# Patient Record
Sex: FEMALE | Race: WHITE | NOT HISPANIC OR LATINO | Employment: FULL TIME | URBAN - METROPOLITAN AREA
[De-identification: names, ages, dates, MRNs, and addresses within clinical notes are randomized per-mention and may not be internally consistent; named-entity substitution may affect disease eponyms.]

---

## 2017-01-21 ENCOUNTER — ALLSCRIPTS OFFICE VISIT (OUTPATIENT)
Dept: OTHER | Facility: OTHER | Age: 32
End: 2017-01-21

## 2017-03-13 ENCOUNTER — GENERIC CONVERSION - ENCOUNTER (OUTPATIENT)
Dept: OTHER | Facility: OTHER | Age: 32
End: 2017-03-13

## 2017-05-09 ENCOUNTER — HOSPITAL ENCOUNTER (OUTPATIENT)
Dept: RADIOLOGY | Facility: CLINIC | Age: 32
Discharge: HOME | End: 2017-05-09
Payer: COMMERCIAL

## 2017-05-09 ENCOUNTER — ALLSCRIPTS OFFICE VISIT (OUTPATIENT)
Dept: OTHER | Facility: OTHER | Age: 32
End: 2017-05-09

## 2017-05-09 ENCOUNTER — TRANSCRIBE ORDERS (OUTPATIENT)
Dept: URGENT CARE | Facility: CLINIC | Age: 32
End: 2017-05-09

## 2017-05-09 DIAGNOSIS — S67.01XA: ICD-10-CM

## 2017-05-09 DIAGNOSIS — R63.5 ABNORMAL WEIGHT GAIN: ICD-10-CM

## 2017-05-09 PROCEDURE — 73140 X-RAY EXAM OF FINGER(S): CPT

## 2017-05-11 ENCOUNTER — ALLSCRIPTS OFFICE VISIT (OUTPATIENT)
Dept: OTHER | Facility: OTHER | Age: 32
End: 2017-05-11

## 2017-05-30 ENCOUNTER — ALLSCRIPTS OFFICE VISIT (OUTPATIENT)
Dept: OTHER | Facility: OTHER | Age: 32
End: 2017-05-30

## 2018-01-12 VITALS
HEART RATE: 74 BPM | RESPIRATION RATE: 16 BRPM | HEIGHT: 62 IN | WEIGHT: 165 LBS | OXYGEN SATURATION: 100 % | TEMPERATURE: 98.2 F | DIASTOLIC BLOOD PRESSURE: 64 MMHG | SYSTOLIC BLOOD PRESSURE: 104 MMHG | BODY MASS INDEX: 30.36 KG/M2

## 2018-01-14 VITALS
HEART RATE: 78 BPM | HEIGHT: 62 IN | DIASTOLIC BLOOD PRESSURE: 70 MMHG | TEMPERATURE: 97.3 F | BODY MASS INDEX: 30.91 KG/M2 | SYSTOLIC BLOOD PRESSURE: 120 MMHG | WEIGHT: 168 LBS | RESPIRATION RATE: 16 BRPM

## 2018-01-14 NOTE — CONSULTS
Chief Complaint  Chief Complaint Free Text Note Form: Patient is here for a MWM Consult  History of Present Illness  Free Text HPI: Obesity-  Severity: Mild  Onset: most of her life  Modifiers:worse w/ depot shot, had tried phentermine  Associated Symptoms:more winded, doesn't have energy  Past Medical History  Active Problems And Past Medical History Reviewed: The active problems and past medical history were reviewed and updated today  Assessment    1  Weight gain (783 1) (R63 5)   2  Anxiety (300 00) (F41 9)   3  Overweight (278 02) (E66 3)    Plan   1  (1) TSH WITH FT4 REFLEX; Status:Active; Requested for:30May2017;     Discussion/Summary  Discussion Summary:   82-year-old female w/ anxiety and excess weight here to pursue medical weight management to improve weight and health  Weight gain/overweight:  -discussed options of conservative vs ZAY program +/- meal replacement vs VLCD  -initial focus of 5-10% weight loss over 3-6 mos for improved health  -screening labs-check TSH, request labs from PCP    Anxiety: Stable  -Continue with Xanax as needed    Patient is interested in very low calorie diet and will meet with dietitian  She will then transition to new direction, 12 week follow up  Patient's Capacity to Self-Care: Patient is able to Self-Care  Understands and agrees with treatment plan: The treatment plan was reviewed with the patient/guardian  The patient/guardian understands and agrees with the treatment plan   Self Referrals:   Self Referrals: Yes Self-[employer] employee      Review of Systems  Focused-Female:   Constitutional: no fever  ENT: no sore throat  Cardiovascular: no chest pain and no palpitations  Respiratory: shortness of breath during exertion  Gastrointestinal: no abdominal pain  Genitourinary: no dysuria  Musculoskeletal: arthralgias  Integumentary: no rashes  Neurological: headache  Other Symptoms: Psych: +anxiety-stable  Active Problems    1   Acute upper respiratory infection (465 9) (J06 9)   2  Crushing injury of right thumb (927 3) (S67 01XA)    Surgical History  Surgical History Reviewed: The surgical history was reviewed and updated today  Family History  Family History Reviewed: The family history was reviewed and updated today  Social History    · Never a smoker   · Non drinker / no alcohol use  Social History Reviewed: The social history was reviewed and updated today  Current Meds   1  Melatonin CAPS; nightly; Therapy: (Recorded:[id number]) to Recorded  Medication List Reviewed: The medication list was reviewed and updated today  Allergies    1  No Known Drug Allergies    Vitals  Vital Signs    Recorded: [id number] 10:23AM   Temperature 96 7 F   Heart Rate 68   Systolic 810, LUE, Sitting   Diastolic 62, LUE, Sitting   Height 5 ft 3 in   Weight 168 lb 4 oz   BMI Calculated 29 8   BSA Calculated 1 8   Waist Circumference 34 5 in  Neck Circumference 15in  Physical Exam    Constitutional   General appearance: Abnormal   well developed and overweight  Eyes Mild conjunctival pallor  Ears, Nose, Mouth, and Throat Moist oral mucosa  Pulmonary   Respiratory effort: No increased work of breathing or signs of respiratory distress  Auscultation of lungs: Clear to auscultation  Cardiovascular   Auscultation of heart: Normal rate and rhythm, normal S1 and S2, without murmurs  Abdomen   Abdomen: Non-tender, no masses      Musculoskeletal   Gait and station: Normal     Psychiatric   Orientation to person, place, and time: Normal     Mood and affect: Normal          Results/Data  STOP BANG Questionnaire [id number] 10:36AM User, s     Test Name Result Flag Reference   STOP BANG Questionnaire Risk of HERMINIA Low Risk     Snoring: No  Tired: Yes  Observed: No  Blood Pressure: No  BMI: No  Age: No  Neck Circumference: No  Gender: No   STOP BANG Questionnaire HERMINIA Total Score 1     Snoring: No  Tired: Yes  Observed: No  Blood Pressure: No  BMI: No  Age: No  Neck Circumference: No  Gender: No       Future Appointments    Date/Time Provider Specialty Site   06/05/2017 10:30 AM Darron Armenta  [facility]     Signatures   Electronically signed by : SAÚL Momin ; May 30 2017 11:07AM EST                       (Author)

## 2018-01-14 NOTE — MISCELLANEOUS
Provider Comments  Provider Comments:   Dear Nubia Woo     We called you about your scheduled appointment for 03/13/2017 today but were unable to reach you  It is very important that you follow up with us so that we can assess your physical and nutritional safety  Please call our office at [phone] to reschedule your appointment       Sincerely,     Gareth Ojeda [facility]            Signatures   Electronically signed by : Jose Richard, ; Mar 13 2017 11:35AM EST                       (Author)

## 2018-01-15 VITALS
OXYGEN SATURATION: 100 % | BODY MASS INDEX: 30.55 KG/M2 | HEIGHT: 62 IN | SYSTOLIC BLOOD PRESSURE: 118 MMHG | RESPIRATION RATE: 16 BRPM | DIASTOLIC BLOOD PRESSURE: 72 MMHG | WEIGHT: 166 LBS | HEART RATE: 75 BPM | TEMPERATURE: 98.8 F

## 2018-01-22 VITALS
BODY MASS INDEX: 29.81 KG/M2 | SYSTOLIC BLOOD PRESSURE: 104 MMHG | HEART RATE: 68 BPM | TEMPERATURE: 96.7 F | WEIGHT: 168.25 LBS | DIASTOLIC BLOOD PRESSURE: 62 MMHG | HEIGHT: 63 IN

## 2020-11-26 ENCOUNTER — APPOINTMENT (EMERGENCY)
Dept: CT IMAGING | Facility: HOSPITAL | Age: 35
End: 2020-11-26
Payer: COMMERCIAL

## 2020-11-26 ENCOUNTER — HOSPITAL ENCOUNTER (EMERGENCY)
Facility: HOSPITAL | Age: 35
Discharge: HOME | End: 2020-11-26
Attending: EMERGENCY MEDICINE | Admitting: EMERGENCY MEDICINE
Payer: COMMERCIAL

## 2020-11-26 VITALS
WEIGHT: 185 LBS | BODY MASS INDEX: 34.04 KG/M2 | RESPIRATION RATE: 23 BRPM | HEART RATE: 66 BPM | DIASTOLIC BLOOD PRESSURE: 65 MMHG | HEIGHT: 62 IN | SYSTOLIC BLOOD PRESSURE: 105 MMHG | TEMPERATURE: 97.8 F | OXYGEN SATURATION: 98 %

## 2020-11-26 DIAGNOSIS — H57.11 PAIN AROUND RIGHT EYE: Primary | ICD-10-CM

## 2020-11-26 LAB
ANION GAP SERPL CALCULATED.3IONS-SCNC: 11 MMOL/L (ref 4–13)
B-HCG SERPL-ACNC: <2 MIU/ML
BASOPHILS # BLD AUTO: 0.02 THOUSANDS/ΜL (ref 0–0.1)
BASOPHILS NFR BLD AUTO: 0 % (ref 0–1)
BUN SERPL-MCNC: 11 MG/DL (ref 5–25)
CALCIUM SERPL-MCNC: 8.7 MG/DL (ref 8.3–10.1)
CHLORIDE SERPL-SCNC: 105 MMOL/L (ref 100–108)
CO2 SERPL-SCNC: 27 MMOL/L (ref 21–32)
CREAT SERPL-MCNC: 0.78 MG/DL (ref 0.6–1.3)
CRP SERPL QL: <3 MG/L
EOSINOPHIL # BLD AUTO: 0.1 THOUSAND/ΜL (ref 0–0.61)
EOSINOPHIL NFR BLD AUTO: 2 % (ref 0–6)
ERYTHROCYTE [DISTWIDTH] IN BLOOD BY AUTOMATED COUNT: 12.2 % (ref 11.6–15.1)
ERYTHROCYTE [SEDIMENTATION RATE] IN BLOOD: 4 MM/HOUR (ref 0–19)
GFR SERPL CREATININE-BSD FRML MDRD: 100 ML/MIN/1.73SQ M
GLUCOSE SERPL-MCNC: 113 MG/DL (ref 65–140)
HCT VFR BLD AUTO: 38 % (ref 34.8–46.1)
HGB BLD-MCNC: 13 G/DL (ref 11.5–15.4)
IMM GRANULOCYTES # BLD AUTO: 0.02 THOUSAND/UL (ref 0–0.2)
IMM GRANULOCYTES NFR BLD AUTO: 0 % (ref 0–2)
LACTATE SERPL-SCNC: 0.9 MMOL/L (ref 0.5–2)
LYMPHOCYTES # BLD AUTO: 1.44 THOUSANDS/ΜL (ref 0.6–4.47)
LYMPHOCYTES NFR BLD AUTO: 22 % (ref 14–44)
MCH RBC QN AUTO: 29.2 PG (ref 26.8–34.3)
MCHC RBC AUTO-ENTMCNC: 34.2 G/DL (ref 31.4–37.4)
MCV RBC AUTO: 85 FL (ref 82–98)
MONOCYTES # BLD AUTO: 0.45 THOUSAND/ΜL (ref 0.17–1.22)
MONOCYTES NFR BLD AUTO: 7 % (ref 4–12)
NEUTROPHILS # BLD AUTO: 4.43 THOUSANDS/ΜL (ref 1.85–7.62)
NEUTS SEG NFR BLD AUTO: 69 % (ref 43–75)
NRBC BLD AUTO-RTO: 0 /100 WBCS
PLATELET # BLD AUTO: 268 THOUSANDS/UL (ref 149–390)
PMV BLD AUTO: 9.4 FL (ref 8.9–12.7)
POTASSIUM SERPL-SCNC: 3.7 MMOL/L (ref 3.5–5.3)
RBC # BLD AUTO: 4.45 MILLION/UL (ref 3.81–5.12)
SODIUM SERPL-SCNC: 143 MMOL/L (ref 136–145)
WBC # BLD AUTO: 6.46 THOUSAND/UL (ref 4.31–10.16)

## 2020-11-26 PROCEDURE — 96365 THER/PROPH/DIAG IV INF INIT: CPT

## 2020-11-26 PROCEDURE — 84702 CHORIONIC GONADOTROPIN TEST: CPT | Performed by: EMERGENCY MEDICINE

## 2020-11-26 PROCEDURE — 96366 THER/PROPH/DIAG IV INF ADDON: CPT

## 2020-11-26 PROCEDURE — 80048 BASIC METABOLIC PNL TOTAL CA: CPT | Performed by: EMERGENCY MEDICINE

## 2020-11-26 PROCEDURE — 85025 COMPLETE CBC W/AUTO DIFF WBC: CPT | Performed by: EMERGENCY MEDICINE

## 2020-11-26 PROCEDURE — 83605 ASSAY OF LACTIC ACID: CPT | Performed by: EMERGENCY MEDICINE

## 2020-11-26 PROCEDURE — 85652 RBC SED RATE AUTOMATED: CPT | Performed by: EMERGENCY MEDICINE

## 2020-11-26 PROCEDURE — 99284 EMERGENCY DEPT VISIT MOD MDM: CPT | Performed by: EMERGENCY MEDICINE

## 2020-11-26 PROCEDURE — 70487 CT MAXILLOFACIAL W/DYE: CPT

## 2020-11-26 PROCEDURE — 86140 C-REACTIVE PROTEIN: CPT | Performed by: EMERGENCY MEDICINE

## 2020-11-26 PROCEDURE — 96375 TX/PRO/DX INJ NEW DRUG ADDON: CPT

## 2020-11-26 PROCEDURE — 36415 COLL VENOUS BLD VENIPUNCTURE: CPT | Performed by: EMERGENCY MEDICINE

## 2020-11-26 PROCEDURE — 99284 EMERGENCY DEPT VISIT MOD MDM: CPT

## 2020-11-26 RX ORDER — TETRACAINE HYDROCHLORIDE 5 MG/ML
1 SOLUTION OPHTHALMIC ONCE
Status: COMPLETED | OUTPATIENT
Start: 2020-11-26 | End: 2020-11-26

## 2020-11-26 RX ORDER — MAGNESIUM SULFATE 1 G/100ML
1 INJECTION INTRAVENOUS ONCE
Status: COMPLETED | OUTPATIENT
Start: 2020-11-26 | End: 2020-11-26

## 2020-11-26 RX ORDER — METOCLOPRAMIDE HYDROCHLORIDE 5 MG/ML
10 INJECTION INTRAMUSCULAR; INTRAVENOUS ONCE
Status: COMPLETED | OUTPATIENT
Start: 2020-11-26 | End: 2020-11-26

## 2020-11-26 RX ADMIN — FLUORESCEIN SODIUM 1 STRIP: 1 STRIP OPHTHALMIC at 17:08

## 2020-11-26 RX ADMIN — MAGNESIUM SULFATE HEPTAHYDRATE 1 G: 1 INJECTION, SOLUTION INTRAVENOUS at 17:48

## 2020-11-26 RX ADMIN — IOHEXOL 100 ML: 350 INJECTION, SOLUTION INTRAVENOUS at 18:33

## 2020-11-26 RX ADMIN — TETRACAINE HYDROCHLORIDE 1 DROP: 5 SOLUTION OPHTHALMIC at 17:08

## 2020-11-26 RX ADMIN — METOCLOPRAMIDE HYDROCHLORIDE 10 MG: 5 INJECTION INTRAMUSCULAR; INTRAVENOUS at 17:45

## 2020-11-26 RX ADMIN — SODIUM CHLORIDE 1000 ML: 0.9 INJECTION, SOLUTION INTRAVENOUS at 17:44

## 2022-07-11 NOTE — PROGRESS NOTES
PT Evaluation     Today's date: 2022  Patient name: Rylee Serrato  : 1985  MRN: [id number]  Referring provider: Viktoria Ayala  Dx:   Encounter Diagnosis     ICD-10-CM    1  Pelvic pain  R10 2        Start Time: 630  Stop Time: 730  Total time in clinic (min): 60 minutes    Assessment  Assessment details: Ab Martinez is a 30year old female with on ongoing history of chronic pelvic pain  She presents with increased tone in PFM and global musculature including abdominal, adductor and hip rotators  Delayed relaxation of PFM with increased tone at rest  These impairments may contribute to her pain which is causing decreased tolerance for functional and work related activities, and decreased quality of life  She could benefit from a trial of PFPT to address impairments and functional limitations and improve overall quality of life  Impairments: abnormal muscle firing, abnormal muscle tone, abnormal or restricted ROM, activity intolerance, lacks appropriate home exercise program and pain with function  Understanding of Dx/Px/POC: good   Prognosis: good    Goals  STG 4 weeks  1  Instruction and independence in ongoing HEP  2  Improved PFM relaxation post activation  3  Increased awareness of tension holding in PFM and global musculature    LTG 12 weeks  1  Decreased  Pain > 50 % with sitting, clothing wear  2  Increase tolerance for vaginal intercourse with 50% or greater reduction in pain during and afterward  3  Improve tone of PFM with < 2/10 to palpation  4  Decrease tenderness to palpation of adductor and abdominal musculature  5  3/5 PFM with full relaxation post activation      Plan  Patient would benefit from: skilled physical therapy  Planned therapy interventions: manual therapy, motor coordination training, neuromuscular re-education, behavior modification, patient education, therapeutic activities, therapeutic exercise and home exercise program  Frequency: 1x week  Duration in weeks: 12  Plan of Care beginning date: 2022  Plan of Care expiration date: 10/11/2022  Treatment plan discussed with: patient        PT Pelvic Floor Subjective:   History of Present Illness:   Stage four endometriosis with diagnosis in   First lap last year  Notes some improvement post lap that got progressively worse  Patient notes excruciating pain especially up to four days before during and after her period  Notes constant low grade pain other times during the month  Pain worse with sitting and intercourse and with higher level exercise and activity  Pain with tighter clothing on abdomen  Pain effects quality of life, work life and interpersonal relationships  Recurrent probem      Social Support:     Lives with:  Significant other    Relationship status: /committed    Work status: employed full time    Stress severity: moderate to high  Diet and Exercise:      Exercise type: yoga and walking    Exercise frequency: 3-4 times per week    Pain with higher level exercise  OB/ gyn History    Gestational History:     Number of term pregnancies: 1    Delivery Type:  section      Menstrual History:      Painful periods:  Difficulty managing menstrual pain    Tolerates tampons: no  Notes very painful periods worst on first day  Generally 4 days before it starts  Searing pain - rectal and vaginal with abdominal cramping  Bladder Function:     Voiding Difficulties positive for: frequent urination      Voiding Difficulties comments:     Urinary leakage: urine leakage (occasionally but rare)    Nocturia (episodes per night): 2 (started after lap)    Painful urination: Yes (during period)      Fluid Intake Type: Water    Intake (ounces):  Water: 80,   Bowel Function:     Bowel Function comments:  Low level pain with BM; some relief post    Bowel frequency: daily    Uses "squatty potty": Squatty Potty  Sexual Function:     Sexually Active:  Sexually active  Pain:     Current pain ratin    At best pain ratin    At worst pain ratin    Location:  Lower abdominal, rectal and vaginal, right greater than left; lateral hips and LBP    Onset:  More than 2 years ago    Duration of symptoms:  Does not go away    Relieving factors:  Heat and medications    Progression:  Worsening  Patient Goals:     Patient goals for therapy:  Decreased pain and improved quality of life    Other patient goals:  Improved tolerance for intercourse      Objective     Neurological Testing     Sensation     Lumbar   Left   Intact: light touch    Right   Intact: light touch    Active Range of Motion     Lumbar   Flexion:  WFL  Extension:  WFL and with pain  Left lateral flexion:  with pain Restriction level: minimal  Right lateral flexion:  WFL    Strength/Myotome Testing     Left Hip   Planes of Motion   Abduction: 4-  Adduction: 4-    Right Hip   Planes of Motion   Abduction: 4-  Adduction: 4-    General Comments:    Lower quarter screen   Knees: unremarkable  Foot/ankle: unremarkable    Hip Comments   Bilateral adductor and piriformis tightness, right greater than left  Trigger points mid and proximal adductor musculature  Pelvic Floor Exam   Abdominal assessment: R UAQ 2/10  R LAQ 4/10  L UAQ 0/10  L LAQ 4/10    Skin inspection:   scars present  Number of scars: 1  Scar 1 location:  Sensitivity level medium     General Perineum Exam:   perineum intact       Visual Inspection of Perineum:   Excursion of perineal body in cephalad direction with contraction of pelvic floor muscles (PFM): fair   Excursion of perineal body in caudal direction with relaxation of pelvic floor muscles (PFM): fair   Involuntary contraction with coughing: yes  Involuntary relaxation with bearing down: yes    Pelvic Organ Prolapse   no pelvic organ prolapse    Pelvic Floor Muscle Exam     Palpation   Moderate increased muscle tension in the ischiocavernosus, super transverse perineal, puborectalis and pubococcygeus  Moderate tenderness on right in the ischiocavernosus, super transverse perineal, puborectalis and pubococcygeus  Minimal tenderness on left in the ischiocavernosus and super transverse perineal  Moderate tenderness on left in the puborectalis and pubococcygeus    Breathing pattern with contraction: apical  Pelvic floor muscle relaxation is delayed       PERFECT Score   Power right: 2+/5  Power left: 2+/5  Endurance (seconds to max): 3  Repetitions (before fatigue): 3        Flowsheet Rows    Flowsheet Row Most Recent Value   PT/OT G-Codes    Current Score 71   Projected Score 0             Precautions:       Manuals 7/13                                                                Neuro Re-Ed                                                                                                        Ther Ex             LE 10'  HEP                                                                                                       Ther Activity             PFM education 13                         Gait Training                                       Modalities

## 2022-07-13 ENCOUNTER — EVALUATION (OUTPATIENT)
Dept: PHYSICAL THERAPY | Age: 37
End: 2022-07-13
Payer: COMMERCIAL

## 2022-07-13 DIAGNOSIS — R10.2 PELVIC PAIN: Primary | ICD-10-CM

## 2022-07-13 PROCEDURE — 97162 PT EVAL MOD COMPLEX 30 MIN: CPT | Performed by: PHYSICAL THERAPIST

## 2022-07-13 PROCEDURE — 97530 THERAPEUTIC ACTIVITIES: CPT | Performed by: PHYSICAL THERAPIST

## 2022-07-13 PROCEDURE — 97110 THERAPEUTIC EXERCISES: CPT | Performed by: PHYSICAL THERAPIST

## 2022-07-13 NOTE — LETTER
2022    [address]  [address] [address]    Patient: Quyen Long   YOB: 1985   Date of Visit: 2022     Encounter Diagnosis     ICD-10-CM    1  Pelvic pain  R10 2        Dear Dr Amador Adhikari: Thank you for your recent referral of Quyen Long  Please review the attached evaluation summary from Myla's recent visit  Please verify that you agree with the plan of care by signing the attached order  If you have any questions or concerns, please do not hesitate to call  I sincerely appreciate the opportunity to share in the care of one of your patients and hope to have another opportunity to work with you in the near future  Sincerely,    Marilyn Vaughan, PT      Referring Provider:      I certify that I have read the below Plan of Care and certify the need for these services furnished under this plan of treatment while under my care  [address] [place] [address]  [address] [facility] [place] [address]  Via Fax: [phone]          PT Evaluation     Today's date: 2022  Patient name: Quyen Long  : 1985  MRN: [id number]  Referring provider: Cisco Belle  Dx:   Encounter Diagnosis     ICD-10-CM    1  Pelvic pain  R10 2        Start Time: 630  Stop Time: 730  Total time in clinic (min): 60 minutes    Assessment  Assessment details: Dorian León is a 30year old female with on ongoing history of chronic pelvic pain  She presents with increased tone in PFM and global musculature including abdominal, adductor and hip rotators  Delayed relaxation of PFM with increased tone at rest  These impairments may contribute to her pain which is causing decreased tolerance for functional and work related activities, and decreased quality of life  She could benefit from a trial of PFPT to address impairments and functional limitations and improve overall quality of life    Impairments: abnormal muscle firing, abnormal muscle tone, abnormal or restricted ROM, activity intolerance, lacks appropriate home exercise program and pain with function  Understanding of Dx/Px/POC: good   Prognosis: good    Goals  STG 4 weeks  1  Instruction and independence in ongoing HEP  2  Improved PFM relaxation post activation  3  Increased awareness of tension holding in PFM and global musculature    LTG 12 weeks  1  Decreased  Pain > 50 % with sitting, clothing wear  2  Increase tolerance for vaginal intercourse with 50% or greater reduction in pain during and afterward  3  Improve tone of PFM with < 2/10 to palpation  4  Decrease tenderness to palpation of adductor and abdominal musculature  5  3/5 PFM with full relaxation post activation  Plan  Patient would benefit from: skilled physical therapy  Planned therapy interventions: manual therapy, motor coordination training, neuromuscular re-education, behavior modification, patient education, therapeutic activities, therapeutic exercise and home exercise program  Frequency: 1x week  Duration in weeks: 12  Plan of Care beginning date: 7/13/2022  Plan of Care expiration date: 10/11/2022  Treatment plan discussed with: patient        PT Pelvic Floor Subjective:   History of Present Illness:   Stage four endometriosis with diagnosis in 2015  First lap last year  Notes some improvement post lap that got progressively worse  Patient notes excruciating pain especially up to four days before during and after her period  Notes constant low grade pain other times during the month  Pain worse with sitting and intercourse and with higher level exercise and activity  Pain with tighter clothing on abdomen  Pain effects quality of life, work life and interpersonal relationships  Recurrent probem      Social Support:     Lives with:  Significant other    Relationship status: /committed    Work status: employed full time    Stress severity: moderate to high    Diet and Exercise: Exercise type: yoga and walking    Exercise frequency: 3-4 times per week    Pain with higher level exercise  OB/ gyn History    Gestational History:     Number of term pregnancies: 1    Delivery Type:  section      Menstrual History:      Painful periods:  Difficulty managing menstrual pain    Tolerates tampons: no  Notes very painful periods worst on first day  Generally 4 days before it starts  Searing pain - rectal and vaginal with abdominal cramping  Bladder Function:     Voiding Difficulties positive for: frequent urination      Voiding Difficulties comments:     Urinary leakage: urine leakage (occasionally but rare)    Nocturia (episodes per night): 2 (started after lap)    Painful urination: Yes (during period)      Fluid Intake Type: Water    Intake (ounces):  Water: 80,   Bowel Function:     Bowel Function comments:  Low level pain with BM; some relief post    Bowel frequency: daily    Uses "squatty potty": Squatty Potty  Sexual Function:     Sexually Active:  Sexually active  Pain:     Current pain ratin    At best pain ratin    At worst pain ratin    Location:  Lower abdominal, rectal and vaginal, right greater than left; lateral hips and LBP    Onset:  More than 2 years ago    Duration of symptoms:  Does not go away    Relieving factors:  Heat and medications    Progression:  Worsening  Patient Goals:     Patient goals for therapy:  Decreased pain and improved quality of life    Other patient goals:  Improved tolerance for intercourse      Objective     Neurological Testing     Sensation     Lumbar   Left   Intact: light touch    Right   Intact: light touch    Active Range of Motion     Lumbar   Flexion:  WFL  Extension:  WFL and with pain  Left lateral flexion:  with pain Restriction level: minimal  Right lateral flexion:  WFL    Strength/Myotome Testing     Left Hip   Planes of Motion   Abduction: 4-  Adduction: 4-    Right Hip   Planes of Motion   Abduction: 4-  Adduction: 4-    General Comments:    Lower quarter screen   Knees: unremarkable  Foot/ankle: unremarkable    Hip Comments   Bilateral adductor and piriformis tightness, right greater than left  Trigger points mid and proximal adductor musculature  Pelvic Floor Exam   Abdominal assessment: R UAQ 2/10  R LAQ 4/10  L UAQ 0/10  L LAQ 4/10    Skin inspection:   scars present  Number of scars: 1  Scar 1 location:  Sensitivity level medium     General Perineum Exam:   perineum intact  Visual Inspection of Perineum:   Excursion of perineal body in cephalad direction with contraction of pelvic floor muscles (PFM): fair   Excursion of perineal body in caudal direction with relaxation of pelvic floor muscles (PFM): fair   Involuntary contraction with coughing: yes  Involuntary relaxation with bearing down: yes    Pelvic Organ Prolapse   no pelvic organ prolapse    Pelvic Floor Muscle Exam     Palpation   Moderate increased muscle tension in the ischiocavernosus, super transverse perineal, puborectalis and pubococcygeus  Moderate tenderness on right in the ischiocavernosus, super transverse perineal, puborectalis and pubococcygeus  Minimal tenderness on left in the ischiocavernosus and super transverse perineal  Moderate tenderness on left in the puborectalis and pubococcygeus    Breathing pattern with contraction: apical  Pelvic floor muscle relaxation is delayed       PERFECT Score   Power right: 2+/5  Power left: 2+/5  Endurance (seconds to max): 3  Repetitions (before fatigue): 3        Flowsheet Rows    Flowsheet Row Most Recent Value   PT/OT G-Codes    Current Score 71   Projected Score 0             Precautions:       Manuals 7/13                                                                Neuro Re-Ed                                                                                                        Ther Ex             LE 10'  HEP Ther Activity             PFM education 13                         Gait Training                                       Modalities

## 2022-07-21 NOTE — PROGRESS NOTES
Daily Note     Today's date: 2022  Patient name: Ame De León  : 1985  MRN: [id number]  Referring provider: Lisa Dash  Dx:   Encounter Diagnosis     ICD-10-CM    1  Pelvic pain  R10 2        Start Time: 930  Stop Time: 1024  Total time in clinic (min): 54 minutes    Subjective: Noticed difficulty with relaxing during adductor stretch  Also notes  Delay in initiation in urine stream  Notes at time of treatment 1/10  Lower abdomen area greater right  Pain generally worse pre during and post period  Notes stretching prior to intercourse was helpful in reducing pain although still painful  Objective: See treatment diary below      Assessment: Tolerated treatment well  Patient would benefit from continued PT Discussed acquiring graduated vaginal dilators for self PFM release and stretch  Patient has wand at home  Verbal instruction for self direct release with wand  Will bring to next visit for in clinic instruction  Moderate tenderness to direct PFM that eased minimally with MT/STM  Right LAQ discomfort with good transverse plane response  Plan: Continue per plan of care        Precautions:       Manuals            Pelvic transverse plane  R  15           Direct PFM  8'                                     Neuro Re-Ed                                                                                                        Ther Ex             LE 10'  HEP 10'           CRK  3"/10"  10x  HEP           Diaphragmatic breathing  HEP                                                                            Ther Activity             PFM education 13                         Gait Training                                       Modalities

## 2022-07-22 ENCOUNTER — OFFICE VISIT (OUTPATIENT)
Dept: PHYSICAL THERAPY | Age: 37
End: 2022-07-22
Payer: COMMERCIAL

## 2022-07-22 DIAGNOSIS — R10.2 PELVIC PAIN: Primary | ICD-10-CM

## 2022-07-22 PROCEDURE — 97140 MANUAL THERAPY 1/> REGIONS: CPT | Performed by: PHYSICAL THERAPIST

## 2022-07-22 PROCEDURE — 97110 THERAPEUTIC EXERCISES: CPT | Performed by: PHYSICAL THERAPIST

## 2022-07-25 NOTE — PROGRESS NOTES
Daily Note     Today's date: 2022  Patient name: Blake Campo  : 1985  MRN: [id number]  Referring provider: Jackie Almanzar  Dx:   Encounter Diagnosis     ICD-10-CM    1  Pelvic pain  R10 2        Start Time: 298  Stop Time:   Total time in clinic (min): 57 minutes    Subjective: Patient notes that intercourse seems a little better with reduced pain upon entry  Notes soreness post last treatment for half the day with tenderness that eased later that day  Notes good this morning with discomfort at 0/10  Objective: See treatment diary below      Assessment: Tolerated treatment well  Patient would benefit from continued PT Verbal instruction of wand for self trigger point internal release  Also encouraged self adductor release and external ischiocav MT  Responded well to right leg pull  Plan: Continue per plan of care        Precautions:       Manuals           Pelvic transverse plane  R  15 10          Direct PFM  8' Ext  10                                    Neuro Re-Ed                                                                                                        Ther Ex             LE 10'  HEP 10' 10          R leg pull   5          CRK  3"/10"  10x  HEP 10x          Diaphragmatic breathing  HEP T/o  10                       Wand use for HEP   8'                                                 Ther Activity             PFM education 13                         Gait Training                                       Modalities

## 2022-07-27 ENCOUNTER — OFFICE VISIT (OUTPATIENT)
Dept: PHYSICAL THERAPY | Age: 37
End: 2022-07-27
Payer: COMMERCIAL

## 2022-07-27 DIAGNOSIS — R10.2 PELVIC PAIN: Primary | ICD-10-CM

## 2022-07-27 PROCEDURE — 97140 MANUAL THERAPY 1/> REGIONS: CPT | Performed by: PHYSICAL THERAPIST

## 2022-07-27 PROCEDURE — 97110 THERAPEUTIC EXERCISES: CPT | Performed by: PHYSICAL THERAPIST

## 2022-07-29 NOTE — PROGRESS NOTES
Daily Note     Today's date: 2022  Patient name: Rylee Serrato  : 1985  MRN: [id number]  Referring provider: Viktoria Ayala  Dx:   Encounter Diagnosis     ICD-10-CM    1  Pelvic pain  R10 2        Start Time: 408  Stop Time: 501  Total time in clinic (min): 53 minutes    Subjective:  Patient notes increased pain right LAQ with increased rectal pain 2/10  Used the wand for self release at home with good tolerance  Patient will get her period by end of the week and generally has increased discomfort coming into this time  Objective: See treatment diary below      Assessment: Tolerated treatment fairly well  Patient with increased discomfort post manual therapy  Advised moist heat or cold pack as needed for discomfort  Mild increased tone bilateral level 1 and 2 musculature  Plan: Continue per plan of care        Precautions:       Manuals          Pelvic transverse plane  R  15 10 10         Direct PFM  8' Ext  10 15                                   Neuro Re-Ed                                                                                                        Ther Ex             LE 10'  HEP 10' 10 10         R leg pull   5 B5         CRK  3"/10"  10x  HEP 10x 10 relax         Diaphragmatic breathing  HEP T/o  10 t/o                      Wand use for HEP   8' review                                                Ther Activity             PFM education 13                         Gait Training                                       Modalities

## 2022-08-01 ENCOUNTER — OFFICE VISIT (OUTPATIENT)
Dept: PHYSICAL THERAPY | Age: 37
End: 2022-08-01
Payer: COMMERCIAL

## 2022-08-01 DIAGNOSIS — R10.2 PELVIC PAIN: Primary | ICD-10-CM

## 2022-08-01 PROCEDURE — 97110 THERAPEUTIC EXERCISES: CPT | Performed by: PHYSICAL THERAPIST

## 2022-08-01 PROCEDURE — 97140 MANUAL THERAPY 1/> REGIONS: CPT | Performed by: PHYSICAL THERAPIST

## 2022-08-09 NOTE — PROGRESS NOTES
Daily Note     Today's date: 8/10/2022  Patient name: Caleb Morris  : 1985  MRN: [id number]  Referring provider: Omar Sloan  Dx:   Encounter Diagnosis     ICD-10-CM    1  Pelvic pain  R10 2        Start Time: 630  Stop Time:   Total time in clinic (min): 53 minutes    Subjective: Just finished her period on   Notes increased tenderness in PFM  Notes increased pelvic pain pressure  NOtes at time of treatment discomfort   Rectal and vaginal discomfort and lower abdomen 2/10  Tenderness R LAQ  Notes pain from vaginal to navel with walking up to 2 5 miles for exercise  Notes decreased cramping somewhat since PT start  Objective: See treatment diary below      Assessment: Tolerated treatment well  Patient would benefit from continued PT  Tenderness B LAQ with ischiocav tightness right that referred pain toward navel  Encouraged breathing and relaxation  Some R LAQ tenderness post treatment reported  Plan: Continue per plan of care        Precautions:       Manuals  8/10        Pelvic transverse plane  R  15 10 10 15        Direct PFM  8' Ext  10 15 10  ind                                  Neuro Re-Ed                                                                                                        Ther Ex             LE 10'  HEP 10' 10 10 10        R leg pull   5 B5 B5        CRK  3"/10"  10x  HEP 10x 10 relax 10        Diaphragmatic breathing  HEP T/o  10 t/o t/o                     Wand use for HEP   8' review                                                Ther Activity             PFM education 13                         Gait Training                                       Modalities

## 2022-08-10 ENCOUNTER — OFFICE VISIT (OUTPATIENT)
Dept: PHYSICAL THERAPY | Age: 37
End: 2022-08-10
Payer: COMMERCIAL

## 2022-08-10 DIAGNOSIS — R10.2 PELVIC PAIN: Primary | ICD-10-CM

## 2022-08-10 PROCEDURE — 97140 MANUAL THERAPY 1/> REGIONS: CPT | Performed by: PHYSICAL THERAPIST

## 2022-08-10 PROCEDURE — 97110 THERAPEUTIC EXERCISES: CPT | Performed by: PHYSICAL THERAPIST

## 2022-08-16 NOTE — PROGRESS NOTES
Daily Note     Today's date: 2022  Patient name: Keila Craig  : 1985  MRN: [id number]  Referring provider: Blaine Evans  Dx:   Encounter Diagnosis     ICD-10-CM    1  Pelvic pain  R10 2        Start Time: 0000  Stop Time:   Total time in clinic (min): 55 minutes    Subjective:  Patient notes increased r LAQ discomfort for 1-2 days post treatment that then eased and "seemed better"  Now back to baseline level of discomfort  Using wand at home for self release but relates some pain at superficial layers and difficulty maneuvering wand  Patient will bring to next visit  Objective: See treatment diary below      Assessment: Tolerated treatment fair  R LAQ discomfort with light palpation  Moderate tenderness at right ischiocavernosus both internal and external PFM  Increased discomfort post treatment  Encouraged relaxation and Hep to tolerance  Verbal instruction in graduated dilators  Also advised to tolerance  Patient would benefit from continued PT Pain persists  Plan: Continue per plan of care        Precautions:       Manuals 7/13 7/22 7/27 8/1 8/10 8/17       Pelvic transverse plane  R  15 10 10 15 15       Direct PFM  8' Ext  10 15 10  ind 10  dir                                 Neuro Re-Ed                                                                                                        Ther Ex             LE 10'  HEP 10' 10 10 10 10       R leg pull   5 B5 B5 5       CRK  3"/10"  10x  HEP 10x 10 relax 10 10       Diaphragmatic breathing  HEP T/o  10 t/o t/o t/o                    Wand use for HEP   8' review  graduated at home                                              Ther Activity             PFM education 13                         Gait Training                                       Modalities

## 2022-08-17 ENCOUNTER — OFFICE VISIT (OUTPATIENT)
Dept: PHYSICAL THERAPY | Age: 37
End: 2022-08-17
Payer: COMMERCIAL

## 2022-08-17 DIAGNOSIS — R10.2 PELVIC PAIN: Primary | ICD-10-CM

## 2022-08-17 PROCEDURE — 97140 MANUAL THERAPY 1/> REGIONS: CPT | Performed by: PHYSICAL THERAPIST

## 2022-08-17 PROCEDURE — 97110 THERAPEUTIC EXERCISES: CPT | Performed by: PHYSICAL THERAPIST

## 2022-08-24 ENCOUNTER — APPOINTMENT (OUTPATIENT)
Dept: PHYSICAL THERAPY | Age: 37
End: 2022-08-24
Payer: COMMERCIAL

## 2022-08-29 NOTE — PROGRESS NOTES
Daily Note     Today's date: 2022  Patient name: Keila Craig  : 1985  MRN: [id number]  Referring provider: Blaine Evans  Dx:   Encounter Diagnosis     ICD-10-CM    1  Pelvic pain  R10 2                   Subjective: ***      Objective: See treatment diary below      Assessment: Tolerated treatment {Tolerated treatment :4257222275}   Patient {assessment:0382524079}      Plan: {PLAN:8240685504}     Precautions:       Manuals 7/13 7/22 7/27 8/1 8/10 8/17       Pelvic transverse plane  R  15 10 10 15 15       Direct PFM  8' Ext  10 15 10  ind 10  dir                                 Neuro Re-Ed                                                                                                        Ther Ex             LE 10'  HEP 10' 10 10 10 10       R leg pull   5 B5 B5 5       CRK  3"/10"  10x  HEP 10x 10 relax 10 10       Diaphragmatic breathing  HEP T/o  10 t/o t/o t/o                    Wand use for HEP   8' review  graduated at home                                              Ther Activity             PFM education 13                         Gait Training                                       Modalities

## 2022-08-31 ENCOUNTER — APPOINTMENT (OUTPATIENT)
Dept: PHYSICAL THERAPY | Age: 37
End: 2022-08-31
Payer: COMMERCIAL

## 2022-08-31 DIAGNOSIS — R10.2 PELVIC PAIN: Primary | ICD-10-CM

## 2022-09-07 ENCOUNTER — OFFICE VISIT (OUTPATIENT)
Dept: PHYSICAL THERAPY | Age: 37
End: 2022-09-07
Payer: COMMERCIAL

## 2022-09-07 DIAGNOSIS — R10.2 PELVIC PAIN: Primary | ICD-10-CM

## 2022-09-07 PROCEDURE — 97110 THERAPEUTIC EXERCISES: CPT | Performed by: PHYSICAL THERAPIST

## 2022-09-07 PROCEDURE — 97140 MANUAL THERAPY 1/> REGIONS: CPT | Performed by: PHYSICAL THERAPIST

## 2022-09-07 NOTE — PROGRESS NOTES
Daily Note     Today's date: 2022  Patient name: Anai Bran  : 1985  MRN: [id number]  Referring provider: Lorene Penaloza  Dx:   Encounter Diagnosis     ICD-10-CM    1  Pelvic pain  R10 2        Start Time: 602  Stop Time:   Total time in clinic (min): 62 minutes    Subjective:  Patient notes an increase in pressure  and tenderness rectal and vaginal area worse with prolonged sitting  Notes increased pain without having therapy over the past near 3 weeks  Objective: See treatment diary below      Assessment: Tolerated treatment fairly well  Moderate transverse perineal body tenderness, greater left,  that reproduced rectal pain upon palpation  Good response to ischemic pressure with internal direct PFM MT  Patient seems to be responding to PT with decreased pain intensity overall with treatments and  would benefit from continued PT  Reported increased rectal soreness post treatment today  Plan: Continue per plan of care        Precautions:       Manuals  8/10 8/17 9/7      Pelvic transverse plane  R  15 10 10 15 15 15      Direct PFM  8' Ext  10 15 10  ind 10  dir 10      Transverse perineal       5                   Neuro Re-Ed                                                                                                        Ther Ex             LE 10'  HEP 10' 10 10 10 10 10      R leg pull   5 B5 B5 5       CRK  3"/10"  10x  HEP 10x 10 relax 10 10 10      Diaphragmatic breathing  HEP T/o  10 t/o t/o t/o 10                   Wand use for HEP   8' review  graduated at home                                              Ther Activity             PFM education 13                         Gait Training                                       Modalities

## 2022-09-14 ENCOUNTER — OFFICE VISIT (OUTPATIENT)
Dept: PHYSICAL THERAPY | Age: 37
End: 2022-09-14
Payer: COMMERCIAL

## 2022-09-14 DIAGNOSIS — R10.2 PELVIC PAIN: Primary | ICD-10-CM

## 2022-09-14 PROCEDURE — 97110 THERAPEUTIC EXERCISES: CPT | Performed by: PHYSICAL THERAPIST

## 2022-09-14 PROCEDURE — 97140 MANUAL THERAPY 1/> REGIONS: CPT | Performed by: PHYSICAL THERAPIST

## 2022-09-14 NOTE — PROGRESS NOTES
Daily Note     Today's date: 2022  Patient name: Blake Campo  : 1985  MRN: [id number]  Referring provider: Jackie Almanzar  Dx:   Encounter Diagnosis     ICD-10-CM    1  Pelvic pain  R10 2        Start Time:   Stop Time: 170  Total time in clinic (min): 45 minutes    Subjective: Patient notes increased soreness and  tenderness for 1 5 days following last treatment session  Notes overall though feels like direct PFM work is helping in overall pain levels  Patient notes high stress levels in personal life currently with awareness of tension holding due to this stress  Objective: See treatment diary below      Assessment: Tolerated treatment well  Patient would benefit from continued PT Tenderness through PC and CT with increased tension that eased partially with MT techniques  Increased awareness of tension holding  Difficulty with deactivation  Plan: Continue per plan of care        Precautions:       Manuals 7/13 7/22 7/27 8/1 8/10 8/17 9/7 9/14     Pelvic transverse plane  R  15 10 10 15 15 15 10     Direct PFM  8' Ext  10 15 10  ind 10  dir 10 10     Transverse perineal       5 R5                  Neuro Re-Ed                                                                                                        Ther Ex             LE 10'  HEP 10' 10 10 10 10 10 10     R leg pull   5 B5 B5 5       CRK  3"/10"  10x  HEP 10x 10 relax 10 10 10      Diaphragmatic breathing  HEP T/o  10 t/o t/o t/o 10 t/o                  Wand use for HEP   8' review  graduated at home                                              Ther Activity             PFM education 13                         Gait Training                                       Modalities

## 2022-09-19 NOTE — PROGRESS NOTES
Daily Note     Today's date: 2022  Patient name: Keya Tucker  : 1985  MRN: [id number]  Referring provider: Jerrod Torres  Dx:   Encounter Diagnosis     ICD-10-CM    1  Pelvic pain  R10 2                   Subjective: ***      Objective: See treatment diary below      Assessment: Tolerated treatment {Tolerated treatment :4658535153}   Patient {assessment:5424939726}      Plan: {PLAN:2489786432}     Precautions:       Manuals 7/13 7/22 7/27 8/1 8/10 8/17 9/7 9/14     Pelvic transverse plane  R  15 10 10 15 15 15 10     Direct PFM  8' Ext  10 15 10  ind 10  dir 10 10     Transverse perineal       5 R5                  Neuro Re-Ed                                                                                                        Ther Ex             LE 10'  HEP 10' 10 10 10 10 10 10     R leg pull   5 B5 B5 5       CRK  3"/10"  10x  HEP 10x 10 relax 10 10 10      Diaphragmatic breathing  HEP T/o  10 t/o t/o t/o 10 t/o                  Wand use for HEP   8' review  graduated at home                                              Ther Activity             PFM education 13                         Gait Training                                       Modalities

## 2022-09-21 ENCOUNTER — APPOINTMENT (OUTPATIENT)
Dept: PHYSICAL THERAPY | Age: 37
End: 2022-09-21
Payer: COMMERCIAL

## 2022-09-21 DIAGNOSIS — R10.2 PELVIC PAIN: Primary | ICD-10-CM

## 2022-09-28 ENCOUNTER — APPOINTMENT (OUTPATIENT)
Dept: PHYSICAL THERAPY | Age: 37
End: 2022-09-28
Payer: COMMERCIAL

## 2022-09-29 NOTE — PROGRESS NOTES
Daily Note     Today's date: 2022  Patient name: Godfrey Johnson  : 1985  MRN: [id number]  Referring provider: Dayanara Reyna  Dx:   Encounter Diagnosis     ICD-10-CM    1  Pelvic pain  R10 2                   Subjective: Notes overall during the month feeling better with eased pelvic pain; during period and just prior with continued intense pain  Notes navel to pubic area with sharp vaginal and rectal pain  Objective: See treatment diary below      Assessment: Tolerated treatment fairly well  Deferred direct PFm MT due to period  Patient would benefit from continued PT  Tender right ischial tuberosity and surrounding musculature  Right psoas tightness  Encouraged relaxation and self release as able  Subjective report of improvement although pain persists  Plan: Continue per plan of care  Progress note during next visit        Precautions:       Manuals 7/13 7/22 7/27 8/1 8/10 8/17 9/7 9/14 9/30    Pelvic transverse plane  R  15 10 10 15 15 15 10 20    Direct PFM  8' Ext  10 15 10  ind 10  dir 10 10     Transverse perineal       5 R5     obturator         5    Neuro Re-Ed                                                                                                        Ther Ex             LE 10'  HEP 10' 10 10 10 10 10 10 15    R leg pull   5 B5 B5 5       CRK  3"/10"  10x  HEP 10x 10 relax 10 10 10  10    Diaphragmatic breathing  HEP T/o  10 t/o t/o t/o 10 t/o t/o                 Wand use for HEP   8' review  graduated at home                                              Ther Activity             PFM education 13                         Gait Training                                       Modalities

## 2022-09-30 ENCOUNTER — OFFICE VISIT (OUTPATIENT)
Dept: PHYSICAL THERAPY | Age: 37
End: 2022-09-30
Payer: COMMERCIAL

## 2022-09-30 DIAGNOSIS — R10.2 PELVIC PAIN: Primary | ICD-10-CM

## 2022-09-30 PROCEDURE — 97140 MANUAL THERAPY 1/> REGIONS: CPT | Performed by: PHYSICAL THERAPIST

## 2022-09-30 PROCEDURE — 97110 THERAPEUTIC EXERCISES: CPT | Performed by: PHYSICAL THERAPIST

## 2022-10-02 NOTE — PROGRESS NOTES
Daily Note     Today's date: 10/3/2022  Patient name: Luis Bahena  : 1985  MRN: [id number]  Referring provider: Melissa Pinto  Dx:   Encounter Diagnosis     ICD-10-CM    1  Pelvic pain  R10 2                   Subjective:  Patient notes a constant tightness in vaginal and rectal areas  Notes one day post period and decreased pain  In the past generally would have increased pain for a few days following period  Objective: See treatment diary below      Assessment: Tolerated treatment well  Patient would benefit from continued PT Improved tissue mobility with decreased tone in perineal body  Moderate IA tone that improved with MT and intentional relaxation breathing  Decreased pain intensity today  Some R LAQ and vaginal discomfort post        Plan: Continue per plan of care  Progress note during next visit        Precautions:       Manuals 7/13 7/22 7/27 8/1 8/10 8/17 9/7 9/14 9/30 10/3   Pelvic transverse plane  R  15 10 10 15 15 15 10 20 10   Direct PFM  8' Ext  10 15 10  ind 10  dir 10 10  10   Transverse perineal       5 R5  5   obturator         5 5 LSL   Neuro Re-Ed                                                                                                        Ther Ex             LE 10'  HEP 10' 10 10 10 10 10 10 15 10   R leg pull   5 B5 B5 5    5   CRK  3"/10"  10x  HEP 10x 10 relax 10 10 10  10    Diaphragmatic breathing  HEP T/o  10 t/o t/o t/o 10 t/o t/o 10                Wand use for HEP   8' review  graduated at home                                              Ther Activity             PFM education 13                         Gait Training                                       Modalities

## 2022-10-03 ENCOUNTER — OFFICE VISIT (OUTPATIENT)
Dept: PHYSICAL THERAPY | Age: 37
End: 2022-10-03
Payer: COMMERCIAL

## 2022-10-03 DIAGNOSIS — R10.2 PELVIC PAIN: Primary | ICD-10-CM

## 2022-10-03 PROCEDURE — 97110 THERAPEUTIC EXERCISES: CPT | Performed by: PHYSICAL THERAPIST

## 2022-10-03 PROCEDURE — 97140 MANUAL THERAPY 1/> REGIONS: CPT | Performed by: PHYSICAL THERAPIST

## 2022-10-12 ENCOUNTER — OFFICE VISIT (OUTPATIENT)
Dept: PHYSICAL THERAPY | Age: 37
End: 2022-10-12
Payer: COMMERCIAL

## 2022-10-12 DIAGNOSIS — R10.2 PELVIC PAIN: Primary | ICD-10-CM

## 2022-10-12 PROCEDURE — 97140 MANUAL THERAPY 1/> REGIONS: CPT | Performed by: PHYSICAL THERAPIST

## 2022-10-12 PROCEDURE — 97110 THERAPEUTIC EXERCISES: CPT | Performed by: PHYSICAL THERAPIST

## 2022-10-12 NOTE — PROGRESS NOTES
Daily Note     Today's date: 10/12/2022  Patient name: Rylee Serrato  : 1985  MRN: [id number]  Referring provider: Viktoria Ayala  Dx:   Encounter Diagnosis     ICD-10-CM    1  Pelvic pain  R10 2                   Subjective: Patient notes overall as a whole feeling better slowly  Notes decreased abdominal discomfort with improved relaxation in abdominal musculature  Notes sharp pain at navel at incision site at times  Notes this is generally the best week during her cycle and present today with less than 1/10 discomfort and some mild cramping during her workday after prolonged sitting  Using wand for self release at home report some difficulty reaching all PFM musculature  Objective: See treatment diary below      Assessment: Tolerated treatment well  Patient would benefit from continued PT Added hip flexor stretch with good tolerance  Direct PFM with improved tone and pain that minimizes more quickly  Notes some vaginal discomft post session that patient relates usually relaxes by the next day  Verbalized discussed self scar massage at navel area  Plan: Continue per plan of care  Add hip flexor stretch to HEP nv       Precautions:       Manuals 10/12 7/22 7/27 8/1 8/10 8/17 9/7 9/14 9/30 10/3   Pelvic transverse plane L  10 R  15 10 10 15 15 15 10 20 10   Direct PFM 10 8' Ext  10 15 10  ind 10  dir 10 10  10   Transverse perineal 5      5 R5  5   obturator         5 5 LSL   Neuro Re-Ed                                                                                                        Ther Ex             LE 10'   10' 10 10 10 10 10 10 15 10   Over table hip flexor stretch 2x            R leg pull 3  5 B5 B5 5    5   CRK  3"/10"  10x  HEP 10x 10 relax 10 10 10  10    Diaphragmatic breathing 10 HEP T/o  10 t/o t/o t/o 10 t/o t/o 10                Wand use for HEP   8' review  graduated at home                                              Ther Activity             PFM education 13 Gait Training                                       Modalities

## 2022-10-20 NOTE — PROGRESS NOTES
Daily Note     Today's date: 10/21/2022  Patient name: Anai Bran  : 1985  MRN: [id number]  Referring provider: Lorene Penaloza  Dx:   Encounter Diagnosis     ICD-10-CM    1  Pelvic pain  R10 2        Start Time: 630  Stop Time: 730  Total time in clinic (min): 60 minutes    Subjective: Notes feeling good  I feel like I make progress and then something happens  Last week nearly 10 hours in a car and prolonged standing with increased pain in perineum with difficulty sitting afterward  Attended yoga class two days later with significant relief  Notes at time of  treatment low level tenderness in lower pelvic and perineum  1/10 this morning  Objective: See treatment diary below      Assessment: Tolerated treatment well  Patient would benefit from continued PT Responding well to PT overall with decreased pain and increased ability to self manage pain  Increased pain this week with prolonged positioning  Eased post yoga class  Left LA/PC/IL tightness that reproduced rectal pain  Plan: Continue per plan of care        Precautions:       Manuals 10/12 10/21  7/27 8/1 8/10 8/17 9/7 9/14 9/30 10/3   Pelvic transverse plane L  10   10 10 10 15 15 15 10 20 10   Direct PFM 10 10 Ext  10 15 10  ind 10  dir 10 10  10   Transverse perineal 5 5     5 R5  5   obturator         5 5 LSL   Neuro Re-Ed                                                                                                        Ther Ex             LE 10'   10' 10 10 10 10 10 10 15 10   Over table hip flexor stretch 2x 2x           R leg pull 3  5 B5 B5 5    5   CRK  3"/10"  10x 10x 10 relax 10 10 10  10    Diaphragmatic breathing 10 t/o T/o  10 t/o t/o t/o 10 t/o t/o 10                Wand use for HEP   8' review  graduated at home                                              Ther Activity             PFM education 13                         Gait Training                                       Modalities

## 2022-10-21 ENCOUNTER — OFFICE VISIT (OUTPATIENT)
Dept: PHYSICAL THERAPY | Age: 37
End: 2022-10-21
Payer: COMMERCIAL

## 2022-10-21 DIAGNOSIS — R10.2 PELVIC PAIN: Primary | ICD-10-CM

## 2022-10-21 PROCEDURE — 97110 THERAPEUTIC EXERCISES: CPT | Performed by: PHYSICAL THERAPIST

## 2022-10-21 PROCEDURE — 97140 MANUAL THERAPY 1/> REGIONS: CPT | Performed by: PHYSICAL THERAPIST

## 2022-10-26 ENCOUNTER — APPOINTMENT (OUTPATIENT)
Dept: PHYSICAL THERAPY | Age: 37
End: 2022-10-26

## 2022-10-26 DIAGNOSIS — R10.2 PELVIC PAIN: Primary | ICD-10-CM

## 2022-11-17 NOTE — PROGRESS NOTES
PT Re-Evaluation     Today's date: 2022  Patient name: Quyen Long  : 1985  MRN: [id number]  Referring provider: Cisco Belle  Dx:   Encounter Diagnosis     ICD-10-CM    1  Pelvic pain  R10 2                      Assessment  Assessment details: Dorian León is a 30year old female with on ongoing history of chronic pelvic pain  She presented with increased tone in PFM and global musculature including abdominal, adductor and hip rotators  This has improved since start of PT with decreased pain intensity and frequency noted in lower abdominal and pelvic region  Improved self management of symptoms with stretching and dilator use  Primary complaint now is that of rectal and perineal pain and pressure  Delayed relaxation of PFM with increased tone at rest  PFM weakness due to tissue restriction  These impairments may contribute to her pain which is causing decreased tolerance for functional, interpersonal, and work related activities and decreased quality of life  She could benefit from continued PFPT to address impairments and functional limitations and improve overall quality of life  Continue with PT goals as outlined on initial evaluation  Impairments: abnormal muscle firing, abnormal muscle tone, abnormal or restricted ROM, activity intolerance, lacks appropriate home exercise program and pain with function  Understanding of Dx/Px/POC: good   Prognosis: good    Goals  STG 4 weeks  1  Instruction and independence in ongoing HEP - MET  2  Improved PFM relaxation post activation  - ongoing  3  Increased awareness of tension holding in PFM and global musculature - MET    LTG 12 weeks  1  Decreased  Pain > 50 % with sitting, clothing wear  - progressingtowards  2  Increase tolerance for vaginal intercourse with 50% or greater reduction in pain during and afterward  - progressing towards  3  Improve tone of PFM with < 2/10 to palpation  - progressing towards  4   Decrease tenderness to palpation of adductor and abdominal musculature  5  3/5 PFM with full relaxation post activation  - ongoing    Plan  Patient would benefit from: skilled physical therapy  Planned therapy interventions: manual therapy, motor coordination training, neuromuscular re-education, behavior modification, patient education, therapeutic activities, therapeutic exercise and home exercise program  Frequency: 1x week  Duration in weeks: 12  Plan of Care beginning date: 2022  Plan of Care expiration date: 2023  Treatment plan discussed with: patient        Subjective Evaluation    History of Present Illness  Mechanism of injury: Stage four endometriosis with diagnosis in   First lap last year  Notes some improvement post lap that got progressively worse  Patient notes excruciating pain especially up to four days before during and after her period  Notes constant low grade pain other times during the month  Pain worse with sitting and intercourse and with higher level exercise and activity  Pain with tighter clothing on abdomen  Pain effects quality of life, work life and interpersonal relationships  22  Patient notes overall no increases in pain since break in PT  Notes upper pelvic pain is less intense and less frequent  Mainly now most pain is rectal and perineum  Worse with sitting  Notes less intense pain during and prior to period  Pain with intercourse slightly better especially with deeper penetration    Pain  Current pain ratin  At best pain ratin  At worst pain ratin  Location: rectal and perineal pelvic  Quality: dull ache, tight and throbbing  Relieving factors: rest and relaxation (stretching, yoga)  Aggravating factors: sitting (prolonged activity; intercourse, bowel movements)  Progression: improved    Social Support  Lives with: significant other and young children    Employment status: working  Patient Goals  Patient goals for therapy: decreased pain  Patient goal: decrease rectal pain; avoid surgery        Objective     Neurological Testing     Sensation     Lumbar   Left   Intact: light touch    Right   Intact: light touch    Active Range of Motion     Lumbar   Flexion:  WFL  Extension:  WFL and with pain  Left lateral flexion:  with pain Restriction level: minimal  Right lateral flexion:  WFL    Strength/Myotome Testing     Left Hip   Planes of Motion   Abduction: 4-  Adduction: 4-    Right Hip   Planes of Motion   Abduction: 4-  Adduction: 4-    General Comments:    Lower quarter screen   Knees: unremarkable  Foot/ankle: unremarkable    Hip Comments   Bilateral adductor and piriformis tightness, right greater than left  Trigger points mid and proximal adductor musculature  Pelvic Floor Exam     Skin inspection:   scars present  Number of scars: 1  Scar 1 location:  Sensitivity level medium     General Perineum Exam:   perineum intact  Visual Inspection of Perineum:   Excursion of perineal body in cephalad direction with contraction of pelvic floor muscles (PFM): fair   Excursion of perineal body in caudal direction with relaxation of pelvic floor muscles (PFM): fair   Involuntary contraction with coughing: yes  Involuntary relaxation with bearing down: yes    Pelvic Organ Prolapse   no pelvic organ prolapse    Pelvic Floor Muscle Exam     Palpation   Moderate increased muscle tension in the ischiocavernosus, super transverse perineal, puborectalis and pubococcygeus  Minimal tenderness on right in the ischiocavernosus and super transverse perineal  Moderate tenderness on right in the ischiocavernosus, super transverse perineal, puborectalis and pubococcygeus  Minimal tenderness on left in the ischiocavernosus and super transverse perineal  Moderate tenderness on left in the ischiocavernosus, super transverse perineal, puborectalis and pubococcygeus    Breathing pattern with contraction: within normal limits  Pelvic floor muscle relaxation is delayed       PERFECT Score   Power right: 2+/5  Power left: 2+/5  Endurance (seconds to max): 3  Repetitions (before fatigue): 4                Precautions:       Manuals 10/12 10/21  11/18 8/1 8/10 8/17 9/7 9/14 9/30 10/3   Pelvic transverse plane L  10   10  10 15 15 15 10 20 10   Direct PFM 10 10   10 15 10  ind 10  dir 10 10  10   Transverse perineal 5 5 10    5 R5  5   obturator   LSL  10      5 5 LSL   Neuro Re-Ed                                                                                                        Ther Ex             LE 10'   10' 10 10 10 10 10 10 15 10   Over table hip flexor stretch 2x 2x           R leg pull 3  5B B5 B5 5    5   CRK  3"/10"  10x 5x 10 relax 10 10 10  10    Diaphragmatic breathing 10 t/o T/o  10 t/o t/o t/o 10 t/o t/o 10                Wand use for HEP    review  graduated at home                                              Ther Activity             PFM education 13                         Gait Training                                       Modalities

## 2022-11-18 ENCOUNTER — OFFICE VISIT (OUTPATIENT)
Dept: PHYSICAL THERAPY | Age: 37
End: 2022-11-18

## 2022-11-18 DIAGNOSIS — R10.2 PELVIC PAIN: Primary | ICD-10-CM

## 2022-11-18 NOTE — LETTER
2022    [address]  [address] ,[address] [address]    Patient: Ame De León   YOB: 1985   Date of Visit: 2022     Encounter Diagnosis     ICD-10-CM    1  Pelvic pain  R10 2           Dear Dr Elyse Ontiveros: Thank you for your recent referral of Ame De León  Please review the attached evaluation summary from Myla's recent visit  Please verify that you agree with the plan of care by signing the attached order  If you have any questions or concerns, please do not hesitate to call  I sincerely appreciate the opportunity to share in the care of one of your patients and hope to have another opportunity to work with you in the near future  Sincerely,    Wily Mak, PT      Referring Provider:      I certify that I have read the below Plan of Care and certify the need for these services furnished under this plan of treatment while under my care  [address] [place] [address]  [address]  Via Fax: [phone]          PT Re-Evaluation     Today's date: 2022  Patient name: Ame De León  : 1985  MRN: [id number]  Referring provider: Lisa Dash  Dx:   Encounter Diagnosis     ICD-10-CM    1  Pelvic pain  R10 2                      Assessment  Assessment details: Enrrique Chambers is a 30year old female with on ongoing history of chronic pelvic pain  She presented with increased tone in PFM and global musculature including abdominal, adductor and hip rotators  This has improved since start of PT with decreased pain intensity and frequency noted in lower abdominal and pelvic region  Improved self management of symptoms with stretching and dilator use  Primary complaint now is that of rectal and perineal pain and pressure  Delayed relaxation of PFM with increased tone at rest  PFM weakness due to tissue restriction   These impairments may contribute to her pain which is causing decreased tolerance for functional, interpersonal, and work related activities and decreased quality of life  She could benefit from continued PFPT to address impairments and functional limitations and improve overall quality of life  Continue with PT goals as outlined on initial evaluation  Impairments: abnormal muscle firing, abnormal muscle tone, abnormal or restricted ROM, activity intolerance, lacks appropriate home exercise program and pain with function  Understanding of Dx/Px/POC: good   Prognosis: good    Goals  STG 4 weeks  1  Instruction and independence in ongoing HEP - MET  2  Improved PFM relaxation post activation  - ongoing  3  Increased awareness of tension holding in PFM and global musculature - MET    LTG 12 weeks  1  Decreased  Pain > 50 % with sitting, clothing wear  - progressingtowards  2  Increase tolerance for vaginal intercourse with 50% or greater reduction in pain during and afterward  - progressing towards  3  Improve tone of PFM with < 2/10 to palpation  - progressing towards  4  Decrease tenderness to palpation of adductor and abdominal musculature  5  3/5 PFM with full relaxation post activation  - ongoing    Plan  Patient would benefit from: skilled physical therapy  Planned therapy interventions: manual therapy, motor coordination training, neuromuscular re-education, behavior modification, patient education, therapeutic activities, therapeutic exercise and home exercise program  Frequency: 1x week  Duration in weeks: 12  Plan of Care beginning date: 11/18/2022  Plan of Care expiration date: 2/16/2023  Treatment plan discussed with: patient        Subjective Evaluation    History of Present Illness  Mechanism of injury: Stage four endometriosis with diagnosis in 2015  First lap last year  Notes some improvement post lap that got progressively worse  Patient notes excruciating pain especially up to four days before during and after her period   Notes constant low grade pain other times during the month  Pain worse with sitting and intercourse and with higher level exercise and activity  Pain with tighter clothing on abdomen  Pain effects quality of life, work life and interpersonal relationships  22  Patient notes overall no increases in pain since break in PT  Notes upper pelvic pain is less intense and less frequent  Mainly now most pain is rectal and perineum  Worse with sitting  Notes less intense pain during and prior to period  Pain with intercourse slightly better especially with deeper penetration  Pain  Current pain ratin  At best pain ratin  At worst pain ratin  Location: rectal and perineal pelvic  Quality: dull ache, tight and throbbing  Relieving factors: rest and relaxation (stretching, yoga)  Aggravating factors: sitting (prolonged activity; intercourse, bowel movements)  Progression: improved    Social Support  Lives with: significant other and young children    Employment status: working  Patient Goals  Patient goals for therapy: decreased pain  Patient goal: decrease rectal pain; avoid surgery        Objective     Neurological Testing     Sensation     Lumbar   Left   Intact: light touch    Right   Intact: light touch    Active Range of Motion     Lumbar   Flexion:  WFL  Extension:  WFL and with pain  Left lateral flexion:  with pain Restriction level: minimal  Right lateral flexion:  WFL    Strength/Myotome Testing     Left Hip   Planes of Motion   Abduction: 4-  Adduction: 4-    Right Hip   Planes of Motion   Abduction: 4-  Adduction: 4-    General Comments:    Lower quarter screen   Knees: unremarkable  Foot/ankle: unremarkable    Hip Comments   Bilateral adductor and piriformis tightness, right greater than left  Trigger points mid and proximal adductor musculature  Pelvic Floor Exam     Skin inspection:   scars present  Number of scars: 1  Scar 1 location:  Sensitivity level medium     General Perineum Exam:   perineum intact       Visual Inspection of Perineum:   Excursion of perineal body in cephalad direction with contraction of pelvic floor muscles (PFM): fair   Excursion of perineal body in caudal direction with relaxation of pelvic floor muscles (PFM): fair   Involuntary contraction with coughing: yes  Involuntary relaxation with bearing down: yes    Pelvic Organ Prolapse   no pelvic organ prolapse    Pelvic Floor Muscle Exam     Palpation   Moderate increased muscle tension in the ischiocavernosus, super transverse perineal, puborectalis and pubococcygeus  Minimal tenderness on right in the ischiocavernosus and super transverse perineal  Moderate tenderness on right in the ischiocavernosus, super transverse perineal, puborectalis and pubococcygeus  Minimal tenderness on left in the ischiocavernosus and super transverse perineal  Moderate tenderness on left in the ischiocavernosus, super transverse perineal, puborectalis and pubococcygeus    Breathing pattern with contraction: within normal limits  Pelvic floor muscle relaxation is delayed       PERFECT Score   Power right: 2+/5  Power left: 2+/5  Endurance (seconds to max): 3  Repetitions (before fatigue): 4               Precautions:       Manuals 10/12 10/21  11/18 8/1 8/10 8/17 9/7 9/14 9/30 10/3   Pelvic transverse plane L  10   10  10 15 15 15 10 20 10   Direct PFM 10 10   10 15 10  ind 10  dir 10 10  10   Transverse perineal 5 5 10    5 R5  5   obturator   LSL  10      5 5 LSL   Neuro Re-Ed                                                                                                        Ther Ex             LE 10'   10' 10 10 10 10 10 10 15 10   Over table hip flexor stretch 2x 2x           R leg pull 3  5B B5 B5 5    5   CRK  3"/10"  10x 5x 10 relax 10 10 10  10    Diaphragmatic breathing 10 t/o T/o  10 t/o t/o t/o 10 t/o t/o 10                Wand use for HEP    review  graduated at home                                              Ther Activity             PFM education 13 Gait Training                                       Modalities

## 2022-11-23 ENCOUNTER — OFFICE VISIT (OUTPATIENT)
Dept: PHYSICAL THERAPY | Age: 37
End: 2022-11-23

## 2022-11-23 DIAGNOSIS — R10.2 PELVIC PAIN: Primary | ICD-10-CM

## 2022-11-23 NOTE — PROGRESS NOTES
Daily Note     Today's date: 2022  Patient name: Estrella Rodriguez  : 1985  MRN: [id number]  Referring provider: Audra León  Dx:   Encounter Diagnosis     ICD-10-CM    1  Pelvic pain  R10 2           Start Time:   Stop Time:   Total time in clinic (min): 55 minutes    Subjective:  Patient notes she got her period yesterday  Notes having rectal and internal vaginal pain 7/10 without medication 3/10 with medication  Pain increase with sitting  Objective: See treatment diary below      Assessment: Tolerated treatment well  Patient would benefit from continued PT Deferred direct PFM MT due to period  Decreased tenderness right LAQ  Responded well to sidelying rotation STM  No increased pain post treatment  Decreased pain overall  Plan: Continue per plan of care        Precautions:       Manuals 10/12 10/21  11/18 11/23   9/7 9/14 9/30 10/3   Pelvic transverse plane L  10   10  10 15 15 15 10 20 10   Direct PFM 10 10   10 15 10  ind 10  dir 10 10  10   Transverse perineal 5 5 10    5 R5  5   obturator   LSL  10 LSL  10     5 5 LSL   Neuro Re-Ed                                                                                                        Ther Ex             LE 10'   10' 10 10 10 10 10 10 15 10   Over table hip flexor stretch 2x 2x           R leg pull 3  5B B5 B5 5    5   CRK  3"/10"  10x 5x 10 relax 10 10 10  10    Diaphragmatic breathing 10 t/o T/o  10 t/o t/o t/o 10 t/o t/o 10                Wand use for HEP    review  graduated at home                                              Ther Activity             PFM education 13                         Gait Training                                       Modalities

## 2022-11-30 NOTE — PROGRESS NOTES
Daily Note     Today's date: 2022  Patient name: Aftab Kapadia  : 1985  MRN: [id number]  Referring provider: Gatito Collins  Dx:   Encounter Diagnosis     ICD-10-CM    1  Pelvic pain  R10 2           Start Time: 5620  Stop Time: 5880  Total time in clinic (min): 53 minutes    Subjective: Subjective: Continues with rectal pressure especially with sitting  Less apparent a week out from her period  Has been walking on TM for 15 minutes with increasing speed with better tolerance  Objective: See treatment diary below      Assessment: Tolerated treatment well  Patient would benefit from continued PT Patient with significant tenderness lower abdominal region left greater than right  Moderate tightness at perineal body that eased with MFR/STM  Patient doing well with self management techniques  Plan: Continue per plan of care        Precautions:       Manuals 10/12 10/21  11/18 11/23   9/7 9/14 9/30 10/3   Pelvic transverse plane L  10   10  10 15 15 15 10 20 10   Direct PFM 10 10   10 15 10  ind 10  dir 10 10  10   Transverse perineal 5 5 10    5 R5  5   obturator   LSL  10 LSL  10     5 5 LSL   Neuro Re-Ed                                                                                                        Ther Ex             LE 10'   10' 10 10 10 10 10 10 15 10   Over table hip flexor stretch 2x 2x           R leg pull 3  5B B5 B5 5    5   CRK  3"/10"  10x 5x 10 relax 10 10 10  10    Diaphragmatic breathing 10 t/o T/o  10 t/o t/o t/o 10 t/o t/o 10                Wand use for HEP    review  graduated at home                                              Ther Activity             PFM education 13                         Gait Training                                       Modalities

## 2022-12-01 ENCOUNTER — OFFICE VISIT (OUTPATIENT)
Dept: PHYSICAL THERAPY | Age: 37
End: 2022-12-01

## 2022-12-01 DIAGNOSIS — R10.2 PELVIC PAIN: Primary | ICD-10-CM

## 2022-12-01 NOTE — PROGRESS NOTES
Daily Note     Today's date: 2022  Patient name: Dallas Isaacs  : 1985  MRN: [id number]  Referring provider: Isha Nolan  Dx:   Encounter Diagnosis     ICD-10-CM    1  Pelvic pain  R10 2           Start Time:   Stop Time:   Total time in clinic (min): 53 minutes    Subjective: Continues with rectal pressure especially with sitting  Less apparent a week out from her period  Has been walking on TM for 15 minutes with increasing speed with better tolerance  Objective: See treatment diary below      Assessment: Tolerated treatment well  Patient would benefit from continued PT Patient with significant tenderness lower abdominal region left greater than right  Moderate tightness at perineal body that eased with MFR/STM  Patient doing well with self management techniques  Plan: Continue per plan of care        Precautions:       Manuals 10/12 10/21  11/18 11/23 12/1  9/7 9/14 9/30 10/3   Pelvic transverse plane L  10   10  10 15 15 15 10 20 10   Direct PFM 10 10   10 15 10  ind 10  dir 10 10  10   Transverse perineal 5 5 10  5  5 R5  5   obturator   LSL  10 LSL  10 LSL  PP    5 5 LSL   Neuro Re-Ed                                                                                                        Ther Ex             LE 10'   10' 10 10 10 10 10 10 15 10   Over table hip flexor stretch 2x 2x           R leg pull 3  5B B5 B5 5    5   CRK  3"/10"  10x 5x 10 relax 10 10 10  10    Diaphragmatic breathing 10 t/o T/o  10 t/o t/o t/o 10 t/o t/o 10                Wand use for HEP    review                                                Ther Activity             PFM education 13                         Gait Training                                       Modalities

## 2022-12-02 ENCOUNTER — APPOINTMENT (OUTPATIENT)
Dept: PHYSICAL THERAPY | Age: 37
End: 2022-12-02

## 2022-12-16 ENCOUNTER — APPOINTMENT (OUTPATIENT)
Dept: PHYSICAL THERAPY | Age: 37
End: 2022-12-16

## 2022-12-20 NOTE — PROGRESS NOTES
Daily Note     Today's date: 2022  Patient name: Rebecca Russo  : 1985  MRN: [id number]  Referring provider: Deidra Salazar  Dx:   Encounter Diagnosis     ICD-10-CM    1  Pelvic pain  R10 2                      Subjective:  Notes generally much better for two weeks out of month with decreased dyspareunia, improved tolerance for clothing variety not increasing symptoms and improved sitting tolerance  Generally these symptoms will increase the week prior to her period and during her period  Objective: See treatment diary below      Assessment: Tolerated treatment well  Patient would benefit from continued PT Weak activation of PFM with delayed relaxation  Reviewed urge suppression techniques to help manage recent UI  Minimal tenderness direct PFM with improved tone  Generally decreased tenderness lower abdominal region as well  Plan: Continue per plan of care  Probable discharge to self care next visit       Precautions:       Manuals 10/12 10/21  11/18 11/23 12/21  9/7 9/14 9/30 10/3   Pelvic transverse plane L  10   10  10 15 15 15 10 20 10   Direct PFM 10 10   10 15 10   10  dir 10 10  10   Transverse perineal 5 5 10    5 R5  5   obturator   LSL  10 LSL  10     5 5 LSL   Neuro Re-Ed                                                                                                        Ther Ex             LE 10'   10' 10 10 10 10 10 10 15 10   Over table hip flexor stretch 2x 2x           R leg pull 3  5B B5  5    5   CRK  3"/10"  10x 5x 10 relax 5x 10 10  10    Diaphragmatic breathing 10 t/o T/o  10 t/o t/o t/o 10 t/o t/o 10                Wand use for HEP    review  graduated at home                                              Ther Activity             PFM education 13            Urge suppression     PP/  HO        Gait Training                                       Modalities

## 2022-12-21 ENCOUNTER — OFFICE VISIT (OUTPATIENT)
Dept: PHYSICAL THERAPY | Age: 37
End: 2022-12-21

## 2022-12-21 DIAGNOSIS — R10.2 PELVIC PAIN: Primary | ICD-10-CM

## 2022-12-30 ENCOUNTER — OFFICE VISIT (OUTPATIENT)
Dept: PHYSICAL THERAPY | Age: 37
End: 2022-12-30

## 2022-12-30 DIAGNOSIS — R10.2 PELVIC PAIN: Primary | ICD-10-CM

## 2022-12-30 NOTE — PROGRESS NOTES
Daily Note     Today's date: 2022  Patient name: Odette Mccartney  : 1985  MRN: [id number]  Referring provider: Benita Portillo  Dx:   Encounter Diagnosis     ICD-10-CM    1  Pelvic pain  R10 2                      Subjective:  Notes varying right sided flares but overall reduced  Notes pain with intercourse persists but is reduced  Overall better and good with self management techniques  Objective: See treatment diary below      Assessment: Tolerated treatment well  Patient independent in self care techniques  At this time, patient has achieved their maximum functional benefit from skilled physical therapy services and will be discharged to their HEP  Patient is in agreement with the plan of care  As a result, patient is discharged from physical therapy  PT goals partially met  Plan: Discharge PT to self care       Precautions:       Manuals 10/12 10/21  11/18 11/23 12/21 12/30 9/7 9/14 9/30 10/3   Pelvic transverse plane L  10   10  10 15 15 15 10 20 10   Direct PFM 10 10   10 15 10   10  dir 10 10  10   Transverse perineal 5 5 10    5 R5  5   obturator   LSL  10 LSL  10     5 5 LSL   Neuro Re-Ed                                                                                                        Ther Ex             LE 10'   10' 10 10 10 10 10 10 15 10   Over table hip flexor stretch 2x 2x           R leg pull 3  5B B5      5   CRK  3"/10"  10x 5x 10 relax 5x 10 10  10    Diaphragmatic breathing 10 t/o T/o  10 t/o t/o t/o 10 t/o t/o 10                Wand use for HEP    review   at home                                              Ther Activity             PFM education 13            Urge suppression     PP/  HO        Gait Training                                       Modalities

## 2023-12-24 ENCOUNTER — HOSPITAL ENCOUNTER (EMERGENCY)
Facility: HOSPITAL | Age: 38
Discharge: HOME | End: 2023-12-25
Attending: EMERGENCY MEDICINE
Payer: COMMERCIAL

## 2023-12-24 DIAGNOSIS — M54.9 CHRONIC BACK PAIN: ICD-10-CM

## 2023-12-24 DIAGNOSIS — R51.0 POSITIONAL HEADACHE: Primary | ICD-10-CM

## 2023-12-24 DIAGNOSIS — G89.29 CHRONIC BACK PAIN: ICD-10-CM

## 2023-12-24 PROCEDURE — 96365 THER/PROPH/DIAG IV INF INIT: CPT

## 2023-12-24 PROCEDURE — 99284 EMERGENCY DEPT VISIT MOD MDM: CPT | Performed by: EMERGENCY MEDICINE

## 2023-12-24 PROCEDURE — 96367 TX/PROPH/DG ADDL SEQ IV INF: CPT

## 2023-12-24 PROCEDURE — 96375 TX/PRO/DX INJ NEW DRUG ADDON: CPT

## 2023-12-24 PROCEDURE — 93005 ELECTROCARDIOGRAM TRACING: CPT

## 2023-12-24 PROCEDURE — 99283 EMERGENCY DEPT VISIT LOW MDM: CPT

## 2023-12-24 RX ORDER — ONDANSETRON 2 MG/ML
4 INJECTION INTRAMUSCULAR; INTRAVENOUS ONCE
Status: COMPLETED | OUTPATIENT
Start: 2023-12-24 | End: 2023-12-24

## 2023-12-24 RX ORDER — METOCLOPRAMIDE HYDROCHLORIDE 5 MG/ML
10 INJECTION INTRAMUSCULAR; INTRAVENOUS ONCE
Status: COMPLETED | OUTPATIENT
Start: 2023-12-24 | End: 2023-12-24

## 2023-12-24 RX ORDER — MAGNESIUM SULFATE HEPTAHYDRATE 40 MG/ML
2 INJECTION, SOLUTION INTRAVENOUS ONCE
Status: COMPLETED | OUTPATIENT
Start: 2023-12-24 | End: 2023-12-24

## 2023-12-24 RX ORDER — ACETAMINOPHEN 325 MG/1
650 TABLET ORAL ONCE
Status: COMPLETED | OUTPATIENT
Start: 2023-12-24 | End: 2023-12-24

## 2023-12-24 RX ORDER — DEXAMETHASONE SODIUM PHOSPHATE 10 MG/ML
10 INJECTION, SOLUTION INTRAMUSCULAR; INTRAVENOUS ONCE
Status: COMPLETED | OUTPATIENT
Start: 2023-12-24 | End: 2023-12-24

## 2023-12-24 RX ORDER — KETOROLAC TROMETHAMINE 30 MG/ML
15 INJECTION, SOLUTION INTRAMUSCULAR; INTRAVENOUS ONCE
Status: COMPLETED | OUTPATIENT
Start: 2023-12-24 | End: 2023-12-24

## 2023-12-24 RX ADMIN — MAGNESIUM SULFATE HEPTAHYDRATE 2 G: 40 INJECTION, SOLUTION INTRAVENOUS at 22:17

## 2023-12-24 RX ADMIN — KETOROLAC TROMETHAMINE 15 MG: 30 INJECTION, SOLUTION INTRAMUSCULAR; INTRAVENOUS at 22:09

## 2023-12-24 RX ADMIN — ACETAMINOPHEN 650 MG: 325 TABLET, FILM COATED ORAL at 22:01

## 2023-12-24 RX ADMIN — SODIUM CHLORIDE 1000 ML: 0.9 INJECTION, SOLUTION INTRAVENOUS at 22:19

## 2023-12-24 RX ADMIN — DEXAMETHASONE SODIUM PHOSPHATE 10 MG: 10 INJECTION, SOLUTION INTRAMUSCULAR; INTRAVENOUS at 22:11

## 2023-12-24 RX ADMIN — ONDANSETRON 4 MG: 2 INJECTION INTRAMUSCULAR; INTRAVENOUS at 23:57

## 2023-12-24 RX ADMIN — CAFFEINE AND SODIUM BENZOATE 500 MG: 125 INJECTION, SOLUTION INTRAMUSCULAR; INTRAVENOUS at 23:22

## 2023-12-24 RX ADMIN — METOCLOPRAMIDE HYDROCHLORIDE 10 MG: 5 INJECTION INTRAMUSCULAR; INTRAVENOUS at 22:13

## 2023-12-24 NOTE — Clinical Note
Myla Philip was seen and treated in our emergency department on 12/24/2023.                Diagnosis: Headache, likely intracranial hypotension    Myla  is off the rest of the shift today, may return to work on return date.    She may return on this date: 01/08/2024         If you have any questions or concerns, please don't hesitate to call.      Tamara Hermosillo, DO    ______________________________           _______________          _______________  Hospital Representative                              Date                                Time

## 2023-12-25 ENCOUNTER — APPOINTMENT (EMERGENCY)
Dept: CT IMAGING | Facility: HOSPITAL | Age: 38
End: 2023-12-25
Payer: COMMERCIAL

## 2023-12-25 ENCOUNTER — HOSPITAL ENCOUNTER (EMERGENCY)
Facility: HOSPITAL | Age: 38
Discharge: HOME | End: 2023-12-25
Attending: STUDENT IN AN ORGANIZED HEALTH CARE EDUCATION/TRAINING PROGRAM
Payer: COMMERCIAL

## 2023-12-25 VITALS
WEIGHT: 165 LBS | TEMPERATURE: 99 F | HEIGHT: 62 IN | OXYGEN SATURATION: 100 % | HEART RATE: 86 BPM | DIASTOLIC BLOOD PRESSURE: 68 MMHG | SYSTOLIC BLOOD PRESSURE: 121 MMHG | BODY MASS INDEX: 30.36 KG/M2 | RESPIRATION RATE: 19 BRPM

## 2023-12-25 VITALS
SYSTOLIC BLOOD PRESSURE: 128 MMHG | OXYGEN SATURATION: 98 % | TEMPERATURE: 98.4 F | RESPIRATION RATE: 18 BRPM | HEART RATE: 70 BPM | DIASTOLIC BLOOD PRESSURE: 87 MMHG

## 2023-12-25 DIAGNOSIS — R20.2 PARESTHESIAS: Primary | ICD-10-CM

## 2023-12-25 LAB
ALBUMIN SERPL BCP-MCNC: 4.8 G/DL (ref 3.5–5)
ALP SERPL-CCNC: 60 U/L (ref 34–104)
ALT SERPL W P-5'-P-CCNC: 16 U/L (ref 7–52)
ANION GAP SERPL CALCULATED.3IONS-SCNC: 12 MMOL/L
AST SERPL W P-5'-P-CCNC: 14 U/L (ref 13–39)
ATRIAL RATE: 116 BPM
ATRIAL RATE: 82 BPM
BASE EX.OXY STD BLDV CALC-SCNC: 88.3 % (ref 60–80)
BASE EXCESS BLDV CALC-SCNC: -7.4 MMOL/L
BASOPHILS # BLD AUTO: 0.01 THOUSANDS/ÂΜL (ref 0–0.1)
BASOPHILS NFR BLD AUTO: 0 % (ref 0–1)
BETA-HYDROXYBUTYRATE: 0.1 MMOL/L
BILIRUB SERPL-MCNC: 0.75 MG/DL (ref 0.2–1)
BUN SERPL-MCNC: 11 MG/DL (ref 5–25)
CALCIUM SERPL-MCNC: 8.7 MG/DL (ref 8.4–10.2)
CARDIAC TROPONIN I PNL SERPL HS: <2 NG/L
CHLORIDE SERPL-SCNC: 106 MMOL/L (ref 96–108)
CO2 SERPL-SCNC: 17 MMOL/L (ref 21–32)
CREAT SERPL-MCNC: 0.88 MG/DL (ref 0.6–1.3)
EOSINOPHIL # BLD AUTO: 0 THOUSAND/ÂΜL (ref 0–0.61)
EOSINOPHIL NFR BLD AUTO: 0 % (ref 0–6)
ERYTHROCYTE [DISTWIDTH] IN BLOOD BY AUTOMATED COUNT: 11.9 % (ref 11.6–15.1)
GFR SERPL CREATININE-BSD FRML MDRD: 84 ML/MIN/1.73SQ M
GLUCOSE SERPL-MCNC: 171 MG/DL (ref 65–140)
GLUCOSE SERPL-MCNC: 207 MG/DL (ref 65–140)
HCG SERPL QL: NEGATIVE
HCO3 BLDV-SCNC: 16.3 MMOL/L (ref 24–30)
HCT VFR BLD AUTO: 45.5 % (ref 34.8–46.1)
HGB BLD-MCNC: 16 G/DL (ref 11.5–15.4)
IMM GRANULOCYTES # BLD AUTO: 0.05 THOUSAND/UL (ref 0–0.2)
IMM GRANULOCYTES NFR BLD AUTO: 0 % (ref 0–2)
LYMPHOCYTES # BLD AUTO: 0.84 THOUSANDS/ÂΜL (ref 0.6–4.47)
LYMPHOCYTES NFR BLD AUTO: 7 % (ref 14–44)
MAGNESIUM SERPL-MCNC: 2.2 MG/DL (ref 1.9–2.7)
MCH RBC QN AUTO: 29.6 PG (ref 26.8–34.3)
MCHC RBC AUTO-ENTMCNC: 35.2 G/DL (ref 31.4–37.4)
MCV RBC AUTO: 84 FL (ref 82–98)
MONOCYTES # BLD AUTO: 0.08 THOUSAND/ÂΜL (ref 0.17–1.22)
MONOCYTES NFR BLD AUTO: 1 % (ref 4–12)
NEUTROPHILS # BLD AUTO: 11.71 THOUSANDS/ÂΜL (ref 1.85–7.62)
NEUTS SEG NFR BLD AUTO: 92 % (ref 43–75)
NRBC BLD AUTO-RTO: 0 /100 WBCS
O2 CT BLDV-SCNC: 19.9 ML/DL
P AXIS: 40 DEGREES
P AXIS: 69 DEGREES
PCO2 BLDV: 28.9 MM HG (ref 42–50)
PH BLDV: 7.37 [PH] (ref 7.3–7.4)
PLATELET # BLD AUTO: 300 THOUSANDS/UL (ref 149–390)
PMV BLD AUTO: 9.3 FL (ref 8.9–12.7)
PO2 BLDV: 55.2 MM HG (ref 35–45)
POTASSIUM SERPL-SCNC: 3.5 MMOL/L (ref 3.5–5.3)
PR INTERVAL: 134 MS
PR INTERVAL: 142 MS
PROT SERPL-MCNC: 8.1 G/DL (ref 6.4–8.4)
QRS AXIS: 54 DEGREES
QRS AXIS: 60 DEGREES
QRSD INTERVAL: 62 MS
QRSD INTERVAL: 70 MS
QT INTERVAL: 308 MS
QT INTERVAL: 374 MS
QTC INTERVAL: 428 MS
QTC INTERVAL: 436 MS
RBC # BLD AUTO: 5.4 MILLION/UL (ref 3.81–5.12)
SODIUM SERPL-SCNC: 135 MMOL/L (ref 135–147)
T WAVE AXIS: 54 DEGREES
T WAVE AXIS: 59 DEGREES
VENTRICULAR RATE: 116 BPM
VENTRICULAR RATE: 82 BPM
WBC # BLD AUTO: 12.69 THOUSAND/UL (ref 4.31–10.16)

## 2023-12-25 PROCEDURE — 82010 KETONE BODYS QUAN: CPT | Performed by: PHYSICIAN ASSISTANT

## 2023-12-25 PROCEDURE — 99284 EMERGENCY DEPT VISIT MOD MDM: CPT

## 2023-12-25 PROCEDURE — 84703 CHORIONIC GONADOTROPIN ASSAY: CPT | Performed by: PHYSICIAN ASSISTANT

## 2023-12-25 PROCEDURE — 80053 COMPREHEN METABOLIC PANEL: CPT | Performed by: PHYSICIAN ASSISTANT

## 2023-12-25 PROCEDURE — 96366 THER/PROPH/DIAG IV INF ADDON: CPT

## 2023-12-25 PROCEDURE — 36415 COLL VENOUS BLD VENIPUNCTURE: CPT | Performed by: PHYSICIAN ASSISTANT

## 2023-12-25 PROCEDURE — 96376 TX/PRO/DX INJ SAME DRUG ADON: CPT

## 2023-12-25 PROCEDURE — 82805 BLOOD GASES W/O2 SATURATION: CPT | Performed by: PHYSICIAN ASSISTANT

## 2023-12-25 PROCEDURE — 85025 COMPLETE CBC W/AUTO DIFF WBC: CPT | Performed by: PHYSICIAN ASSISTANT

## 2023-12-25 PROCEDURE — 84484 ASSAY OF TROPONIN QUANT: CPT | Performed by: PHYSICIAN ASSISTANT

## 2023-12-25 PROCEDURE — 83735 ASSAY OF MAGNESIUM: CPT | Performed by: PHYSICIAN ASSISTANT

## 2023-12-25 PROCEDURE — 96374 THER/PROPH/DIAG INJ IV PUSH: CPT

## 2023-12-25 PROCEDURE — 82948 REAGENT STRIP/BLOOD GLUCOSE: CPT

## 2023-12-25 PROCEDURE — 99285 EMERGENCY DEPT VISIT HI MDM: CPT | Performed by: PHYSICIAN ASSISTANT

## 2023-12-25 PROCEDURE — 70450 CT HEAD/BRAIN W/O DYE: CPT

## 2023-12-25 PROCEDURE — 93005 ELECTROCARDIOGRAM TRACING: CPT

## 2023-12-25 RX ORDER — CAFFEINE 200 MG
200 TABLET ORAL 2 TIMES DAILY
Qty: 28 TABLET | Refills: 0 | Status: SHIPPED | OUTPATIENT
Start: 2023-12-25

## 2023-12-25 RX ORDER — KETOROLAC TROMETHAMINE 10 MG/1
10 TABLET, FILM COATED ORAL EVERY 6 HOURS PRN
Qty: 30 TABLET | Refills: 0 | Status: SHIPPED | OUTPATIENT
Start: 2023-12-25

## 2023-12-25 RX ORDER — KETOROLAC TROMETHAMINE 30 MG/ML
15 INJECTION, SOLUTION INTRAMUSCULAR; INTRAVENOUS ONCE
Status: COMPLETED | OUTPATIENT
Start: 2023-12-25 | End: 2023-12-25

## 2023-12-25 RX ORDER — CAFFEINE 200 MG
200 TABLET ORAL 2 TIMES DAILY
Qty: 20 TABLET | Refills: 0 | Status: SHIPPED | OUTPATIENT
Start: 2023-12-25

## 2023-12-25 RX ORDER — METOCLOPRAMIDE 10 MG/1
10 TABLET ORAL EVERY 6 HOURS
Qty: 20 TABLET | Refills: 0 | Status: SHIPPED | OUTPATIENT
Start: 2023-12-25

## 2023-12-25 RX ADMIN — KETOROLAC TROMETHAMINE 15 MG: 30 INJECTION, SOLUTION INTRAMUSCULAR; INTRAVENOUS at 09:07

## 2023-12-25 RX ADMIN — KETOROLAC TROMETHAMINE 15 MG: 30 INJECTION, SOLUTION INTRAMUSCULAR at 01:33

## 2023-12-25 NOTE — DISCHARGE INSTRUCTIONS
If you / family member develop any of the following, please return to the Emergency Department for re-evaluation and possible repeat imaging:  Severe/Worsening headache  Somnolence/Confusion/Excessive sleepiness   Restless/Unsteadiness  Seizures  Difficulties with vision  Vomiting/Fever/Stiff neck  Incontinence of stool or urine  New weakness or numbness anywhere    You can take 200 mg of caffeine twice a day (up to 600 mg/day).  Intracranial hypotension has been shown to resolve them to repeat within 2 weeks.  Please follow-up with neurology.  Do not take Toradol with any other NSAIDs.

## 2023-12-25 NOTE — ED PROVIDER NOTES
History  Chief Complaint   Patient presents with    Back Pain     PT had epidural injection last week for sacral nerve root compression and now c/o new onset bad headache, back pain and nausea.      HPI  ED Course as of 12/30/23 0121   Sun Dec 24, 2023   2145 Patient is a 38 y.o. female with PMHx endometriosis, sciatica with S2/S1 nerve root decompression injection on Wednesday 12/20 who presents to the ED for evaluation of positional headache with associated tinnitus and nausea. Reports worsening of chronic back pain with minimal relief after procedure. Patient reports gradually worsening headache, significant change with position including standing, associatee nausea, no vomiting. Reports her left leg feels colder than the right but has full sensation. Denies saddle anesthesia, bowel or bladder incontinence, focal deficits, weakness, difficulty ambulating, changes in vision or hearing, falls or head trauma, fevers or chills, or any other complaints or concerns at this time.   2150 ASSESSMENT: Patient is a 38 y.o. female who presents with positional headache s/p paraspinal nerve decompression.   DDX includes but not limited to: intracranial hypotension, migraine, tension headache, doubt CSF leak.   PLAN: Will treat symptomatically, reevaluate.   2330 Patient reevaluated, reports improvement in symptoms. Reports persistent nausea with slight improvement. Denies any new or worsening complaints or concerns at this time. Zofran ordered.   Mon Dec 25, 2023   0054 Patient reevaluated, reports improvement in symptoms. Denies any new or worsening complaints or concerns at this time. Discussed results and plan with patient. Advised on need for outpatient follow up, given information and referral for neurology. Given return precautions verbally and in discharge instructions, confirmed with teach back method. All questions answered prior to discharge. Patient expressed verbal understanding and is agreeable with plan for  discharge with outpatient follow up.  Patient advised on proper use of all prescription medications to go home with.         None       Past Medical History:   Diagnosis Date    Endometriosis        History reviewed. No pertinent surgical history.    History reviewed. No pertinent family history.  I have reviewed and agree with the history as documented.    E-Cigarette/Vaping    E-Cigarette Use Never User      E-Cigarette/Vaping Substances     Social History     Tobacco Use    Smoking status: Never    Smokeless tobacco: Never   Vaping Use    Vaping status: Never Used   Substance Use Topics    Alcohol use: Never    Drug use: Never        Review of Systems   Musculoskeletal:  Positive for back pain.   Neurological:  Positive for headaches.       Physical Exam  ED Triage Vitals   Temperature Pulse Respirations Blood Pressure SpO2   12/24/23 2037 12/24/23 2035 12/24/23 2035 12/24/23 2035 12/24/23 2035   98.4 °F (36.9 °C) 92 18 131/98 99 %      Temp Source Heart Rate Source Patient Position - Orthostatic VS BP Location FiO2 (%)   12/24/23 2037 12/24/23 2035 12/25/23 0151 12/25/23 0151 --   Oral Monitor Lying Right arm       Pain Score       12/24/23 2035       10 - Worst Possible Pain             Orthostatic Vital Signs  Vitals:    12/24/23 2035 12/24/23 2130 12/25/23 0151   BP: 131/98  128/87   Pulse: 92 78 70   Patient Position - Orthostatic VS:   Lying       Physical Exam  Vitals and nursing note reviewed.   Constitutional:       General: She is not in acute distress.  HENT:      Head: Normocephalic and atraumatic.      Mouth/Throat:      Mouth: Mucous membranes are moist.   Eyes:      General: No scleral icterus.     Conjunctiva/sclera: Conjunctivae normal.   Cardiovascular:      Rate and Rhythm: Normal rate and regular rhythm.      Heart sounds: Normal heart sounds.   Pulmonary:      Effort: Pulmonary effort is normal. No respiratory distress.      Breath sounds: Normal breath sounds.   Abdominal:      Palpations:  Abdomen is soft.      Tenderness: There is no abdominal tenderness. There is no guarding or rebound.   Musculoskeletal:         General: No deformity. Normal range of motion.      Cervical back: Normal range of motion and neck supple. No bony tenderness.      Thoracic back: No bony tenderness.      Lumbar back: Tenderness (right paralumbar region, right SI joint, right parasacral region) present. No bony tenderness. Negative right straight leg raise test and negative left straight leg raise test.   Skin:     General: Skin is warm.      Capillary Refill: Capillary refill takes less than 2 seconds.      Findings: No rash.   Neurological:      Mental Status: She is alert and oriented to person, place, and time. Mental status is at baseline.      Cranial Nerves: Cranial nerves 2-12 are intact. No cranial nerve deficit, dysarthria or facial asymmetry.      Sensory: Sensation is intact. No sensory deficit.      Motor: Motor function is intact. No weakness or pronator drift.      Coordination: Coordination is intact. Finger-Nose-Finger Test and Heel to Shin Test normal.      Gait: Gait is intact.   Psychiatric:         Mood and Affect: Mood normal.         Behavior: Behavior normal.         ED Medications  Medications   sodium chloride 0.9 % bolus 1,000 mL (0 mL Intravenous Stopped 12/25/23 0031)   ketorolac (TORADOL) injection 15 mg (15 mg Intravenous Given 12/24/23 2209)   acetaminophen (TYLENOL) tablet 650 mg (650 mg Oral Given 12/24/23 2201)   metoclopramide (REGLAN) injection 10 mg (10 mg Intravenous Given 12/24/23 2213)   magnesium sulfate 2 g/50 mL IVPB (premix) 2 g (0 g Intravenous Stopped 12/24/23 2329)   dexamethasone (PF) (DECADRON) injection 10 mg (10 mg Intravenous Given 12/24/23 2211)   caffeine-sodium benzoate 500 mg in sodium chloride 0.9 % 1,000 mL IVPB (0 mg Intravenous Stopped 12/25/23 0112)   ondansetron (ZOFRAN) injection 4 mg (4 mg Intravenous Given 12/24/23 2357)   ketorolac (TORADOL) injection 15 mg  (15 mg Intravenous Given 12/25/23 0133)       Diagnostic Studies  Results Reviewed       None                   No orders to display         Procedures  Procedures      ED Course  ED Course as of 12/30/23 0121   Sun Dec 24, 2023   2145 Patient is a 38 y.o. female with PMHx endometriosis, sciatica with S2/S1 nerve root decompression injection on Wednesday 12/20 who presents to the ED for evaluation of positional headache with associated tinnitus and nausea. Reports worsening of chronic back pain with minimal relief after procedure. Patient reports gradually worsening headache, significant change with position including standing, associatee nausea, no vomiting. Reports her left leg feels colder than the right but has full sensation. Denies saddle anesthesia, bowel or bladder incontinence, focal deficits, weakness, difficulty ambulating, changes in vision or hearing, falls or head trauma, fevers or chills, or any other complaints or concerns at this time.   2150 ASSESSMENT: Patient is a 38 y.o. female who presents with positional headache s/p paraspinal nerve decompression.   DDX includes but not limited to: intracranial hypotension, migraine, tension headache, doubt CSF leak.   PLAN: Will treat symptomatically, reevaluate.   2330 Patient reevaluated, reports improvement in symptoms. Reports persistent nausea with slight improvement. Denies any new or worsening complaints or concerns at this time. Zofran ordered.   Mon Dec 25, 2023   0054 Patient reevaluated, reports improvement in symptoms. Denies any new or worsening complaints or concerns at this time. Discussed results and plan with patient. Advised on need for outpatient follow up, given information and referral for neurology. Given return precautions verbally and in discharge instructions, confirmed with teach back method. All questions answered prior to discharge. Patient expressed verbal understanding and is agreeable with plan for discharge with outpatient  follow up.  Patient advised on proper use of all prescription medications to go home with.                             SBIRT 22yo+      Flowsheet Row Most Recent Value   Initial Alcohol Screen: US AUDIT-C     1. How often do you have a drink containing alcohol? 0 Filed at: 12/24/2023 2037   2. How many drinks containing alcohol do you have on a typical day you are drinking?  0 Filed at: 12/24/2023 2037   3b. FEMALE Any Age, or MALE 65+: How often do you have 4 or more drinks on one occassion? 0 Filed at: 12/24/2023 2037   Audit-C Score 0 Filed at: 12/24/2023 2037   DEREK: How many times in the past year have you...    Used an illegal drug or used a prescription medication for non-medical reasons? Never Filed at: 12/24/2023 2037                  Medical Decision Making  Problems Addressed:  Chronic back pain: chronic illness or injury with exacerbation, progression, or side effects of treatment  Positional headache: acute illness or injury    Amount and/or Complexity of Data Reviewed  Independent Historian: spouse  External Data Reviewed: notes.     Details: Procedure note from 12/18    Risk  OTC drugs.  Prescription drug management.  Decision regarding hospitalization.        See ED course above for additional details including HPI, MDM including PLAN, and disposition.    Disposition  Final diagnoses:   Positional headache   Chronic back pain     Time reflects when diagnosis was documented in both MDM as applicable and the Disposition within this note       Time User Action Codes Description Comment    12/25/2023  1:28 AM Tamara Estrada [R51.0] Positional headache     12/25/2023  1:28 AM Tamara Estrada [M54.9,  G89.29] Chronic back pain           ED Disposition       ED Disposition   Discharge    Condition   Stable    Date/Time   Mon Dec 25, 2023 0056    Comment   Myla Philip discharge to home/self care.                   Follow-up Information       Follow up With Specialties Details Why Contact Info  Additional Information    [facility] Neurology [facility] Call   [address]  [facility] [address] [phone] [facility], [address], [place], [id number] [phone]    [facility] Emergency Department Emergency Medicine Go to  If symptoms worsen [address] [facility] [place] [facility] [id number] [phone] [facility] Emergency Department, [address] [place], [place], [id number] [phone]            Discharge Medication List as of 12/25/2023  1:37 AM        START taking these medications    Details   !! caffeine 200 mg tablet Take 1 tablet (200 mg total) by mouth 2 (two) times a day, Starting Mon 12/25/2023, Normal      !! caffeine 200 mg tablet Take 1 tablet (200 mg total) by mouth 2 (two) times a day, Starting Mon 12/25/2023, Print      !! ketorolac (TORADOL) 10 mg tablet Take 1 tablet (10 mg total) by mouth every 6 (six) hours as needed for moderate pain, Starting Mon 12/25/2023, Normal      !! ketorolac (TORADOL) 10 mg tablet Take 1 tablet (10 mg total) by mouth every 6 (six) hours as needed for moderate pain, Starting Mon 12/25/2023, Print      !! metoclopramide (Reglan) 10 mg tablet Take 1 tablet (10 mg total) by mouth every 6 (six) hours, Starting Mon 12/25/2023, Normal      !! metoclopramide (Reglan) 10 mg tablet Take 1 tablet (10 mg total) by mouth every 6 (six) hours, Starting Mon 12/25/2023, Print       !! - Potential duplicate medications found. Please discuss with provider.            PDMP Review       None             ED Provider  Attending physically available and evaluated Myla Philip. I managed the patient along with the ED Attending.    Electronically Signed by           Tamara Hermosillo,   12/30/23 0128

## 2023-12-25 NOTE — ED PROVIDER NOTES
"History  Chief Complaint   Patient presents with    Numbness     Pt arrived ambulatory c/o  bilateral arm numbness and bilateral leg heaviness onset 4:00 while trying to fall asleep.     37yo female with a history of endometriosis presenting for evaluation of extremity paresthesias. She had a transforaminal epidural steroid injection at S1 at the [facility] 1 week ago for ongoing low back pain. She started having a headache 3 days after the injection which became positional 2 days ago. Her headache worsened and she was seen at [facility] ED last night. She received a migraine cocktail with improvement of the headache and she was discharged. She reports developing a heaviness sensation in her bilateral forearms around 4am this morning. She also reports \"pins and needles\" sensation and cramping. She then started having these symptoms in the bilateral thighs. The cramping improves if she moves her extremities. Her headache is mild currently and is rated as a 1-2/10 in severity. She denies any fevers, visual changes, incontinence, chest pain, shortness of breath.       History provided by:  Patient   used: No        Prior to Admission Medications   Prescriptions Last Dose Informant Patient Reported? Taking?   caffeine 200 mg tablet   No No   Sig: Take 1 tablet (200 mg total) by mouth 2 (two) times a day   caffeine 200 mg tablet   No No   Sig: Take 1 tablet (200 mg total) by mouth 2 (two) times a day   ketorolac (TORADOL) 10 mg tablet   No No   Sig: Take 1 tablet (10 mg total) by mouth every 6 (six) hours as needed for moderate pain   ketorolac (TORADOL) 10 mg tablet   No No   Sig: Take 1 tablet (10 mg total) by mouth every 6 (six) hours as needed for moderate pain   metoclopramide (Reglan) 10 mg tablet   No No   Sig: Take 1 tablet (10 mg total) by mouth every 6 (six) hours   metoclopramide (Reglan) 10 mg tablet   No No   Sig: Take 1 tablet (10 mg total) by mouth every 6 (six) hours    "   Facility-Administered Medications: None       Past Medical History:   Diagnosis Date    Endometriosis        No past surgical history on file.    No family history on file.  I have reviewed and agree with the history as documented.    E-Cigarette/Vaping    E-Cigarette Use Never User      E-Cigarette/Vaping Substances     Social History     Tobacco Use    Smoking status: Never    Smokeless tobacco: Never   Vaping Use    Vaping status: Never Used   Substance Use Topics    Alcohol use: Never    Drug use: Never       Review of Systems   Constitutional:  Negative for chills and fever.   HENT:  Negative for drooling and voice change.    Eyes:  Negative for discharge and redness.   Respiratory:  Negative for shortness of breath and stridor.    Cardiovascular:  Negative for chest pain and leg swelling.   Gastrointestinal:  Negative for nausea and vomiting.   Musculoskeletal:  Negative for neck pain and neck stiffness.   Skin:  Negative for color change and rash.   Neurological:  Positive for numbness and headaches. Negative for seizures and syncope.   Psychiatric/Behavioral:  Negative for confusion. The patient is not nervous/anxious.    All other systems reviewed and are negative.      Physical Exam  Physical Exam  Vitals and nursing note reviewed.   Constitutional:       General: She is not in acute distress.     Appearance: She is well-developed. She is not diaphoretic.   HENT:      Head: Normocephalic and atraumatic.      Right Ear: External ear normal.      Left Ear: External ear normal.      Nose: Nose normal.   Eyes:      General: No scleral icterus.        Right eye: No discharge.         Left eye: No discharge.      Extraocular Movements: Extraocular movements intact.      Conjunctiva/sclera: Conjunctivae normal.      Pupils: Pupils are equal, round, and reactive to light.   Cardiovascular:      Rate and Rhythm: Regular rhythm. Tachycardia present.      Heart sounds: Normal heart sounds. No murmur  heard.  Pulmonary:      Effort: Pulmonary effort is normal. No respiratory distress.      Breath sounds: Normal breath sounds. No stridor. No wheezing or rales.   Musculoskeletal:         General: No deformity. Normal range of motion.      Cervical back: Normal range of motion and neck supple.   Lymphadenopathy:      Cervical: No cervical adenopathy.   Skin:     General: Skin is warm and dry.   Neurological:      General: No focal deficit present.      Mental Status: She is alert. She is not disoriented.      GCS: GCS eye subscore is 4. GCS verbal subscore is 5. GCS motor subscore is 6.      Comments: CN 2-12 intact. PERRL. EOMs intact. Visual fields normal. 5/5 strength and crude sensation intact in all extremities. Negative drift x4. Normal finger to nose and heel to shin bilaterally. No dysarthria.    Psychiatric:         Mood and Affect: Mood is anxious.         Behavior: Behavior normal.         Vital Signs  ED Triage Vitals [12/25/23 0630]   Temperature Pulse Respirations Blood Pressure SpO2   99 °F (37.2 °C) (!) 116 18 144/81 98 %      Temp Source Heart Rate Source Patient Position - Orthostatic VS BP Location FiO2 (%)   Temporal Monitor Lying Right arm --      Pain Score       --           Vitals:    12/25/23 0630 12/25/23 0645 12/25/23 0800 12/25/23 0900   BP: 144/81 136/76 117/67 121/68   Pulse: (!) 116 (!) 109 93 86   Patient Position - Orthostatic VS: Lying Lying           Visual Acuity  Visual Acuity      Flowsheet Row Most Recent Value   L Pupil Size (mm) 3   R Pupil Size (mm) 3            ED Medications  Medications   ketorolac (TORADOL) injection 15 mg (15 mg Intravenous Given 12/25/23 0907)       Diagnostic Studies  Results Reviewed       Procedure Component Value Units Date/Time    Beta Hydroxybutyrate [[id number]]  (Normal) Collected: 12/25/23 0741    Lab Status: Final result Specimen: Blood from Arm, Left Updated: 12/25/23 0807     BETA-HYDROXYBUTYRATE 0.1 mmol/L     Blood gas, venous [[id number]]   (Abnormal) Collected: 12/25/23 0741    Lab Status: Final result Specimen: Blood from Arm, Left Updated: 12/25/23 0755     pH, Kamron 7.368     pCO2, Kamron 28.9 mm Hg      pO2, Kamron 55.2 mm Hg      HCO3, Kamron 16.3 mmol/L      Base Excess, Kamron -7.4 mmol/L      O2 Content, Kamron 19.9 ml/dL      O2 HGB, VENOUS 88.3 %     hCG, qualitative pregnancy [[id number]]  (Normal) Collected: 12/25/23 0701    Lab Status: Final result Specimen: Blood from Arm, Left Updated: 12/25/23 0735     Preg, Serum Negative    HS Troponin 0hr (reflex protocol) [[id number]]  (Normal) Collected: 12/25/23 0701    Lab Status: Final result Specimen: Blood from Arm, Left Updated: 12/25/23 0733     hs TnI 0hr <2 ng/L     CBC and differential [[id number]]  (Abnormal) Collected: 12/25/23 0701    Lab Status: Final result Specimen: Blood from Arm, Left Updated: 12/25/23 0730     WBC 12.69 Thousand/uL      RBC 5.40 Million/uL      Hemoglobin 16.0 g/dL      Hematocrit 45.5 %      MCV 84 fL      MCH 29.6 pg      MCHC 35.2 g/dL      RDW 11.9 %      MPV 9.3 fL      Platelets 300 Thousands/uL      nRBC 0 /100 WBCs      Neutrophils Relative 92 %      Immat GRANS % 0 %      Lymphocytes Relative 7 %      Monocytes Relative 1 %      Eosinophils Relative 0 %      Basophils Relative 0 %      Neutrophils Absolute 11.71 Thousands/µL      Immature Grans Absolute 0.05 Thousand/uL      Lymphocytes Absolute 0.84 Thousands/µL      Monocytes Absolute 0.08 Thousand/µL      Eosinophils Absolute 0.00 Thousand/µL      Basophils Absolute 0.01 Thousands/µL     Narrative:      This is an appended report.  These results have been appended to a previously verified report.    Comprehensive metabolic panel [[id number]]  (Abnormal) Collected: 12/25/23 0701    Lab Status: Final result Specimen: Blood from Arm, Left Updated: 12/25/23 0727     Sodium 135 mmol/L      Potassium 3.5 mmol/L      Chloride 106 mmol/L      CO2 17 mmol/L      ANION GAP 12 mmol/L      BUN 11 mg/dL      Creatinine 0.88 mg/dL       Glucose 207 mg/dL      Calcium 8.7 mg/dL      AST 14 U/L      ALT 16 U/L      Alkaline Phosphatase 60 U/L      Total Protein 8.1 g/dL      Albumin 4.8 g/dL      Total Bilirubin 0.75 mg/dL      eGFR 84 ml/min/1.73sq m     Narrative:      National Kidney Disease Foundation guidelines for Chronic Kidney Disease (CKD):     Stage 1 with normal or high GFR (GFR > 90 mL/min/1.73 square meters)    Stage 2 Mild CKD (GFR = 60-89 mL/min/1.73 square meters)    Stage 3A Moderate CKD (GFR = 45-59 mL/min/1.73 square meters)    Stage 3B Moderate CKD (GFR = 30-44 mL/min/1.73 square meters)    Stage 4 Severe CKD (GFR = 15-29 mL/min/1.73 square meters)    Stage 5 End Stage CKD (GFR <15 mL/min/1.73 square meters)  Note: GFR calculation is accurate only with a steady state creatinine    Magnesium [[id number]]  (Normal) Collected: 12/25/23 0701    Lab Status: Final result Specimen: Blood from Arm, Left Updated: 12/25/23 0727     Magnesium 2.2 mg/dL     Fingerstick Glucose (POCT) [[id number]]  (Abnormal) Collected: 12/25/23 0636    Lab Status: Final result Updated: 12/25/23 0638     POC Glucose 171 mg/dl                    CT head without contrast   Final Result by Edgar Ferrari MD (12/25 0737)      No acute intracranial abnormality.                  Workstation performed: [id number]                    Procedures  ECG 12 Lead Documentation Only    Date/Time: 12/25/2023 1:49 PM    Performed by: Farida Hagen PA-C  Authorized by: Farida Hagen PA-C    Indications / Diagnosis:  Paresthesias  ECG reviewed by me, the ED Provider: yes    Patient location:  ED  Rate:     ECG rate:  116    ECG rate assessment: tachycardic    Rhythm:     Rhythm: sinus tachycardia    Ectopy:     Ectopy: none    QRS:     QRS axis:  Normal  Conduction:     Conduction: normal    ST segments:     ST segments:  Normal  T waves:     T waves: normal             ED Course  ED Course as of 12/26/23 1249   Mon Dec 25, 2023   0746 Neuro paged.    4251 pH, Kamron: 7.368  "  0814 BETA-HYDROXYBUTYRATE: 0.1   0842 Discussed case with Dr. Sanon, on call neurologist. He states this is unlikely to be a stroke or spinal cord injury as symptoms are bilateral, involve the upper extremities, and started 1 week after injection. He does not recommend admission or MRI.                  Medical Decision Making  38yoF here with \"heaviness\" and \"pins and needles\" sensation to her bilateral forearms and thighs that began around 4am this morning. She was seen at hospitals ED overnight for a headache and she received a migraine cocktail. Headache is mostly resolved. She had an epidural steroid injection 1 week ago for low back pain. She is tachycardic on arrival although is anxious on exam. Remainder of vitals are reassuring. Neuro exam is non-focal. NIHSS 0.    Initial ED plan: Check cardiac labs, HCG, EKG, and CT head. Will discuss with neurology.     Final assessment: Labs reveal a leukocytosis with a WBC of 12. Glucose 207, no prior history of diabetes. Bicarb is 17. VBG and beta hydroxybutyrate added, no evidence of DKA. Suspect leukocytosis and hyperglycemia is related to IV Decadron that she received last night. CT head is normal. I discussed the patient's case with the on call neurologist. No further testing/MRI is needed per my discussion with neurology. I discussed findings and recommendations with patient. She is very concerned that she is having a spinal cord stroke. Provided reassurance that her symptoms do not correlate to this and I also discussed her concerns regarding this to the neurologist who agrees. She is stable for discharge. Advised close PCP and neurology follow-up. ED return precautions discussed. Patient expressed understanding and is agreeable to plan. Patient discharged in stable condition.    Problems Addressed:  Paresthesias: acute illness or injury    Amount and/or Complexity of Data Reviewed  Labs: ordered. Decision-making details documented in ED Course.  Radiology: " ordered.  ECG/medicine tests: ordered and independent interpretation performed.    Risk  Prescription drug management.             Disposition  Final diagnoses:   Paresthesias     Time reflects when diagnosis was documented in both MDM as applicable and the Disposition within this note       Time User Action Codes Description Comment    12/25/2023  9:02 AM Farida Hagen Add [R20.2] Paresthesias           ED Disposition       ED Disposition   Discharge    Condition   Stable    Date/Time   Mon Dec 25, 2023  9:02 AM    Comment   Myla Philip discharge to home/self care.                   Follow-up Information       Follow up With Specialties Details Why Contact Info Additional Information    Diana Moreno  Schedule an appointment as soon as possible for a visit   [address]  [address]  [facility] [address]  [phone]       [facility] Neurology Schedule an appointment as soon as possible for a visit   [address] [facility] [address] [phone] [facility], [address] [place] [address], [address] [phone]    [facility] Emergency Department Emergency Medicine  If symptoms worsen [address] [facility] [address] [phone] [facility] Emergency Department, [address] [place], [place], [address]            Discharge Medication List as of 12/25/2023  9:03 AM        CONTINUE these medications which have NOT CHANGED    Details   !! caffeine 200 mg tablet Take 1 tablet (200 mg total) by mouth 2 (two) times a day, Starting Mon 12/25/2023, Normal      !! caffeine 200 mg tablet Take 1 tablet (200 mg total) by mouth 2 (two) times a day, Starting Mon 12/25/2023, Print      !! ketorolac (TORADOL) 10 mg tablet Take 1 tablet (10 mg total) by mouth every 6 (six) hours as needed for moderate pain, Starting Mon 12/25/2023, Normal      !! ketorolac  (TORADOL) 10 mg tablet Take 1 tablet (10 mg total) by mouth every 6 (six) hours as needed for moderate pain, Starting Mon 12/25/2023, Print      !! metoclopramide (Reglan) 10 mg tablet Take 1 tablet (10 mg total) by mouth every 6 (six) hours, Starting Mon 12/25/2023, Normal      !! metoclopramide (Reglan) 10 mg tablet Take 1 tablet (10 mg total) by mouth every 6 (six) hours, Starting Mon 12/25/2023, Print       !! - Potential duplicate medications found. Please discuss with provider.          No discharge procedures on file.    PDMP Review       None            ED Provider  Electronically Signed by             Farida Hagen PA-C  12/26/23 1257

## 2023-12-25 NOTE — DISCHARGE INSTRUCTIONS
Please follow-up with your family doctor and neurology. Return to the ER with any new or worsening symptoms.

## 2023-12-25 NOTE — ED ATTENDING ATTESTATION
"Final Diagnoses:     1. Positional headache    2. Chronic back pain      ED Course as of 12/27/23 0412   Sun Dec 24, 2023   2143 This is a 39 y/o F with sciatica/endometriosis has tinnitus, LH, midline RIGHT back pain.  On Wednesday, she had S2/S1 nerve root decompression with steroid injection  Headache gradually getting worse.  Standing makes it worse.  Brings on ringing.  Called office who said no CSF involvement.  Back pain is getting worse but feels similar to previous sciatica.  Feels her \"leg is cold\"  Tactile sensation intact just feels subjectively \"cold\"  No saddle anesthesia  No incontinence  No focal deficits  No weakness  No ataxia.    2240 General: VSS, NAD, awake, alert.   Well-nourished, well-developed.   Appears stated age.   Head: Normocephalic, atraumatic, nontender.  Eyes: PERRL, EOM-I. No diplopia.   No hyphema.   No subconjunctival hemorrhages.  Symmetrical lids.   ENTAtraumatic external nose and ears.    MMM  No stridor.   Normal phonation.   No drooling.   Base of mouth is soft. No mastoid tenderness.   Neck: Symmetric, trachea midline. No JVD.  CV: Peripheral pulses +2 throughout.   No chest wall tenderness.   Lungs:   Unlabored   No retractions  No crepitus.   No tachypnea.   No paradoxical motion.  Abd: +BS, soft, NT/ND.   MSK:   Extremities without tenderness or gross deformity.   FROM   No lower extremity edema.   Back:   No C/T/L-spine tenderness.   No CVAT  Skin: Dry, intact.   Neuro: AAOx3, GCS 15, CN II-XII grossly intact.   Motor grossly intact.  Sensory grossly intact  C-spine: NT, supple. No midline tenderness. Kernig's Brudzinski's negative.   EHL/FHL/PF/DF/KF/KE/HF/HE 5/5. No saddle anesthesia. 2+ patellar reflexes. SLR negative.   M/U/R/A nerve sensation bilateral intact and equal.   strength 5/5. Finger abduction/adduction/opposition intact.   Suppination/Pronation intact without reproduction of pain.   Good capillary refill. 2+ radial pulses, bilaterally equal. "   WE/WF/WRD/WUD 5/5, intact, without pain.   BICEPS/TRICEPS 5/5.   Shoulder abduction/adduction 5/5.   No pronator drift.   No aphasia/dysarthria. CN 2-12 intact.   Normal finger to nose.   No nystagmus.   No facial droop.   Psychiatric/Behavioral: Appropriate mood and affect   Exam: deferred     2240 A/P:  - Discussed possibility of post-procedural headache / cerebral hypotension. I think that's like...  - That  jose said, discussed CT H being low yield.  - I think we can do supportive measures and if no improvement, pursue at that point.        I, Humberto French MD, saw and evaluated the patient. All available labs and X-rays were ordered by me or the resident / non-physician and have been reviewed by myself. I discussed the patient with the resident / non-physician and agree with the resident's / non-physician practitioner's findings and plan as documented in the resident's / non-physician practicitioner's note, except where noted.   At this point, I agree with the current assessment done in the ED.   I was present during key portions of all procedures performed unless otherwise stated.     Nursing Triage:     Chief Complaint   Patient presents with    Back Pain     PT had epidural injection last week for sacral nerve root compression and now c/o new onset bad headache, back pain and nausea.        HPI:   As above     ASSESSMENT + PLAN:   As per ED course     Physical:     Vitals:    12/24/23 2035 12/24/23 2037 12/24/23 2130 12/25/23 0151   BP: 131/98   128/87   BP Location:    Right arm   Pulse: 92  78 70   Resp: 18  18 18   Temp:  98.4 °F (36.9 °C)     TempSrc:  Oral     SpO2: 99%  100% 98%       - There are no obvious limitations to social determinants of care.   - Nursing note reviewed.   - Vitals reviewed.   - Orders placed by myself and/or advanced practitioner / resident.    - Previous chart was reviewed  - No language barrier.   - History obtained from patient.    - There are no limitations to the  history obtained:     Past Medical:    has a past medical history of Endometriosis.    Past Surgical:    has no past surgical history on file.    Social:     Social History     Substance and Sexual Activity   Alcohol Use Never     Social History     Tobacco Use   Smoking Status Never   Smokeless Tobacco Never     Social History     Substance and Sexual Activity   Drug Use Never       Echo:   No results found for this or any previous visit.    No results found for this or any previous visit.      Cath:    No results found for this or any previous visit.      Code Status: No Order  Advance Directive and Living Will:      Power of :    POLST:    Medications   sodium chloride 0.9 % bolus 1,000 mL (0 mL Intravenous Stopped 12/25/23 0031)   ketorolac (TORADOL) injection 15 mg (15 mg Intravenous Given 12/24/23 2209)   acetaminophen (TYLENOL) tablet 650 mg (650 mg Oral Given 12/24/23 2201)   metoclopramide (REGLAN) injection 10 mg (10 mg Intravenous Given 12/24/23 2213)   magnesium sulfate 2 g/50 mL IVPB (premix) 2 g (0 g Intravenous Stopped 12/24/23 2329)   dexamethasone (PF) (DECADRON) injection 10 mg (10 mg Intravenous Given 12/24/23 2211)   caffeine-sodium benzoate 500 mg in sodium chloride 0.9 % 1,000 mL IVPB (0 mg Intravenous Stopped 12/25/23 0112)   ondansetron (ZOFRAN) injection 4 mg (4 mg Intravenous Given 12/24/23 2357)   ketorolac (TORADOL) injection 15 mg (15 mg Intravenous Given 12/25/23 0133)     No orders to display     Orders Placed This Encounter   Procedures    Ambulatory Referral to Neurology    ECG 12 lead     Labs Reviewed - No data to display  Time reflects when diagnosis was documented in both MDM as applicable and the Disposition within this note       Time User Action Codes Description Comment    12/25/2023  1:28 AM Tamara Estrada [R51.0] Positional headache     12/25/2023  1:28 AM Tamara Estrada [M54.9,  G89.29] Chronic back pain           ED Disposition       ED Disposition  "  Discharge    Condition   Stable    Date/Time   Mon Dec 25, 2023 12:56 AM    Comment   Myla Philip discharge to home/self care.                   Follow-up Information       Follow up With Specialties Details Why Contact Info Additional Information    [facility] Neurology [facility] Neurology Call   [address]  [facility] [address] [phone] [facility], [address], [place], [address] [phone]    [facility] Emergency Department Emergency Medicine Go to  [facility] symptoms worsen [facility] [address] [phone] [facility] Emergency Department, [address] [place], [place], [address] [phone]          Discharge Medication List as of 12/25/2023  1:37 AM        START taking these medications    Details   !! caffeine 200 mg tablet Take 1 tablet (200 mg total) by mouth 2 (two) times a day, Starting Mon 12/25/2023, Normal      !! caffeine 200 mg tablet Take 1 tablet (200 mg total) by mouth 2 (two) times a day, Starting Mon 12/25/2023, Print      !! ketorolac (TORADOL) 10 mg tablet Take 1 tablet (10 mg total) by mouth every 6 (six) hours as needed for moderate pain, Starting Mon 12/25/2023, Normal      !! ketorolac (TORADOL) 10 mg tablet Take 1 tablet (10 mg total) by mouth every 6 (six) hours as needed for moderate pain, Starting Mon 12/25/2023, Print      !! metoclopramide (Reglan) 10 mg tablet Take 1 tablet (10 mg total) by mouth every 6 (six) hours, Starting Mon 12/25/2023, Normal      !! metoclopramide (Reglan) 10 mg tablet Take 1 tablet (10 mg total) by mouth every 6 (six) hours, Starting Mon 12/25/2023, Print       !! - Potential duplicate medications found. Please discuss with provider.          None                        Portions of the record may have been created with voice recognition software. Occasional wrong word or \"sound a like\" substitutions " may have occurred due to the inherent limitations of voice recognition software. Read the chart carefully and recognize, using context, where substitutions have occurred.    Electronically signed by:  Humberto French

## 2024-09-30 ENCOUNTER — HOSPITAL ENCOUNTER (EMERGENCY)
Facility: HOSPITAL | Age: 39
Discharge: HOME | End: 2024-09-30
Attending: EMERGENCY MEDICINE | Admitting: EMERGENCY MEDICINE
Payer: COMMERCIAL

## 2024-09-30 ENCOUNTER — APPOINTMENT (OUTPATIENT)
Dept: RADIOLOGY | Facility: HOSPITAL | Age: 39
End: 2024-09-30
Payer: COMMERCIAL

## 2024-09-30 VITALS
DIASTOLIC BLOOD PRESSURE: 90 MMHG | WEIGHT: 164 LBS | BODY MASS INDEX: 30 KG/M2 | HEART RATE: 89 BPM | RESPIRATION RATE: 18 BRPM | TEMPERATURE: 97.7 F | SYSTOLIC BLOOD PRESSURE: 137 MMHG | OXYGEN SATURATION: 96 %

## 2024-09-30 DIAGNOSIS — S93.601A SPRAIN OF RIGHT FOOT, INITIAL ENCOUNTER: Primary | ICD-10-CM

## 2024-09-30 PROCEDURE — 99283 EMERGENCY DEPT VISIT LOW MDM: CPT

## 2024-09-30 PROCEDURE — 73610 X-RAY EXAM OF ANKLE: CPT

## 2024-09-30 PROCEDURE — 73630 X-RAY EXAM OF FOOT: CPT

## 2024-09-30 PROCEDURE — 99284 EMERGENCY DEPT VISIT MOD MDM: CPT | Performed by: EMERGENCY MEDICINE

## 2024-10-01 NOTE — ED PROVIDER NOTES
Final diagnoses:   Sprain of right foot, initial encounter     ED Disposition       ED Disposition   Discharge    Condition   Stable    Date/Time   Mon Sep 30, 2024 10:49 PM    Comment   Myla Cedrick discharge to home/self care.                   Assessment & Plan       Medical Decision Making  X-ray negative for fracture or dislocation.  Most likely sprain.  Occult fracture is also in differential.  Orthopedic shoe/crutches ordered.  Follow-up with podiatry.    Amount and/or Complexity of Data Reviewed  Radiology: ordered and independent interpretation performed. Decision-making details documented in ED Course.    Risk  OTC drugs.  Decision regarding hospitalization.             Medications - No data to display    ED Risk Strat Scores                           SBIRT 20yo+      Flowsheet Row Most Recent Value   Initial Alcohol Screen: US AUDIT-C     1. How often do you have a drink containing alcohol? 0 Filed at: 09/30/2024 2051   2. How many drinks containing alcohol do you have on a typical day you are drinking?  0 Filed at: 09/30/2024 2051   3b. FEMALE Any Age, or MALE 65+: How often do you have 4 or more drinks on one occassion? 0 Filed at: 09/30/2024 2051   Audit-C Score 0 Filed at: 09/30/2024 2051   DEREK: How many times in the past year have you...    Used an illegal drug or used a prescription medication for non-medical reasons? Never Filed at: 09/30/2024 2051                            History of Present Illness       Chief Complaint   Patient presents with    Foot Pain     C/o R foot pain after kicking something accidentally this afternoon        Past Medical History:   Diagnosis Date    Endometriosis       History reviewed. No pertinent surgical history.   History reviewed. No pertinent family history.   Social History     Tobacco Use    Smoking status: Never    Smokeless tobacco: Never   Vaping Use    Vaping status: Never Used   Substance Use Topics    Alcohol use: Never    Drug use: Never       E-Cigarette/Vaping    E-Cigarette Use Never User       E-Cigarette/Vaping Substances      I have reviewed and agree with the history as documented.     Patient is a 39-year-old female.  She kicked a door earlier today.  She is complaining of right foot pain, mostly when she walks.  Pain is to the heel and to the bottom of the forefoot.  She did report some tingling and burning.  No weakness or paralysis.        Review of Systems   Skin:  Negative for wound.   Neurological:  Negative for weakness and numbness.   All other systems reviewed and are negative.          Objective       ED Triage Vitals [09/30/24 2050]   Temperature Pulse Blood Pressure Respirations SpO2 Patient Position - Orthostatic VS   97.7 °F (36.5 °C) 89 137/90 18 96 % --      Temp Source Heart Rate Source BP Location FiO2 (%) Pain Score    Oral Monitor -- -- --      Vitals      Date and Time Temp Pulse SpO2 Resp BP Pain Score FACES Pain Rating User   09/30/24 2050 97.7 °F (36.5 °C) 89 96 % 18 137/90 -- -- AM            Physical Exam  Vitals reviewed.   Constitutional:       General: She is not in acute distress.  HENT:      Head: Normocephalic and atraumatic.      Nose: Nose normal.      Mouth/Throat:      Mouth: Mucous membranes are moist.   Eyes:      General:         Right eye: No discharge.         Left eye: No discharge.      Conjunctiva/sclera: Conjunctivae normal.   Pulmonary:      Effort: Pulmonary effort is normal. No respiratory distress.   Musculoskeletal:         General: No deformity.      Cervical back: Normal range of motion and neck supple.      Comments: Normal inspection.  No specific point tenderness.  Patient is having difficulty ambulating.  Neurovascular exam is normal.  No swelling or ecchymosis.  No lacerations.   Skin:     General: Skin is warm and dry.      Findings: No rash.   Neurological:      General: No focal deficit present.      Mental Status: She is alert and oriented to person, place, and time.   Psychiatric:          Mood and Affect: Mood normal.         Behavior: Behavior normal.         Results Reviewed       None            XR ankle 3+ views RIGHT   ED Interpretation by Zaire Greer MD (09/30 2245)   No fracture or dislocation      XR foot 3+ views RIGHT   ED Interpretation by Zaire Greer MD (09/30 2245)   No fracture or dislocation          Procedures    ED Medication and Procedure Management   Prior to Admission Medications   Prescriptions Last Dose Informant Patient Reported? Taking?   caffeine 200 mg tablet   No No   Sig: Take 1 tablet (200 mg total) by mouth 2 (two) times a day   caffeine 200 mg tablet   No No   Sig: Take 1 tablet (200 mg total) by mouth 2 (two) times a day   ketorolac (TORADOL) 10 mg tablet   No No   Sig: Take 1 tablet (10 mg total) by mouth every 6 (six) hours as needed for moderate pain   ketorolac (TORADOL) 10 mg tablet   No No   Sig: Take 1 tablet (10 mg total) by mouth every 6 (six) hours as needed for moderate pain   metoclopramide (Reglan) 10 mg tablet   No No   Sig: Take 1 tablet (10 mg total) by mouth every 6 (six) hours   metoclopramide (Reglan) 10 mg tablet   No No   Sig: Take 1 tablet (10 mg total) by mouth every 6 (six) hours      Facility-Administered Medications: None     Patient's Medications   Discharge Prescriptions    No medications on file     No discharge procedures on file.  ED SEPSIS DOCUMENTATION   Time reflects when diagnosis was documented in both MDM as applicable and the Disposition within this note       Time User Action Codes Description Comment    9/30/2024 10:50 PM Zaire Greer Add [S93.601A] Sprain of right foot, initial encounter                  Zaire Grere MD  09/30/24 3139

## 2024-11-17 NOTE — PROGRESS NOTES
PT Evaluation     Today's date: 2024  Patient name: Myla Philip  : 1985  MRN: [id number]  Referring provider: Gabriela Spivey DPM  Dx:   Encounter Diagnosis     ICD-10-CM    1. Gait, antalgic  R26.89           Start Time: 0816  Stop Time: 0900  Total time in clinic (min): 44 minutes    Assessment  Impairments: abnormal gait, abnormal or restricted ROM, activity intolerance, impaired physical strength, lacks appropriate home exercise program, pain with function, poor body mechanics, unable to perform ADL, participation limitations and activity limitations  Symptom irritability: high    Assessment details: Pt is a 39 y.o. F that presents to PT with antalgic gait with R foot pain. She has current limitations of R ankle AROM, strength, and irritability with WB. These limitations reduce her ability to ambulate, drive, and complete ADLs. Discussed with pt possibility of CRPS being probable cause, but will be working diagnosis. Pt was educated of an HEP and completed the exercises to demonstrate understanding. Pt would benefit from skilled PT to accomplish the following goals.      Goals  STG: In 4 weeks:  1. Pt will safely complete HEP independently.  2. Pt will obtain R ankle AROM DF >0 degrees.  3. Pt will obtain worse NPRS of R ankle <5/10.  LTG: To be met at discharge:  1. Pt will report of being able to ambulate >200 ft with NPRS <3/10.  2. Pt will obtain R ankle MMT >4/5 for DF and PF.  3. Pt will report being able to drive with NPRS <3/10.  4. Pt will obtain a FOTO score of >58/100.      Plan  Patient would benefit from: skilled physical therapy    Planned therapy interventions: functional ROM exercises, home exercise program, therapeutic exercise, therapeutic activities, stretching, strengthening, patient/caregiver education, neuromuscular re-education, manual therapy, joint mobilization and graded motor    Frequency: 2x week  Duration in weeks: 12  Plan of Care beginning date: 2024  Plan of  "Care expiration date: 2025  Treatment plan discussed with: patient        Subjective Evaluation    History of Present Illness  Mechanism of injury: Around 5 weeks, she kicked her foot into a hard box. She feels like she is stepping on glass at times when walking. The foot can change colors at times. She can step on her bare foot, but it can be hard to walk and the pain can shoot up when she sits back down. The severity can change day to day. The pain runs from her big toe into her heel and compression makes the pain worse. She denies numbness into the foot. She can tell sharp vs dull in her foot. She does report increased back pain since having this injury. She is going for EMG study later today. She was given steroids to help with swelling and notices significant changes since on the medication.   Patient Goals  Patient goal: \"I want to be able to walk, drive, wear my shoes with no pain\" \"I want to get rid of the pain\"  Pain  Current pain ratin  At best pain ratin  Quality: dull ache and sharp  Relieving factors: heat          Objective     Tenderness     Right Ankle/Foot   Tenderness in the first metatarsal head.     Neurological Testing     Sensation     Ankle/Foot   Left Ankle/Foot   Intact: light touch    Right Ankle/Foot   Intact: light touch     Active Range of Motion   Left Ankle/Foot   Dorsiflexion (ke): 5 degrees   Plantar flexion: 50 degrees   Inversion: 30 degrees   Eversion: 10 degrees     Right Ankle/Foot   Dorsiflexion (ke): -20 degrees   Plantar flexion: 40 degrees   Inversion: 30 degrees   Eversion: 5 degrees     Strength/Myotome Testing     Left Ankle/Foot   Dorsiflexion: 5  Inversion: 4  Eversion: 4    Tests     Right Ankle/Foot   Negative for Danielle and Tinel's sign (tarsal tunnel).              Precautions: N/A  POC expires Unit limit Auth Expiration date PT/OT/ST + Visit Limit?   25 4 N/A 60 VISITS                           Visit/Unit Tracking  AUTH Status:  Date          "      N/A Used 1               Remaining                  [email or web address]  Access Code: [email or web address]    Manuals 11/18                                                                Neuro Re-Ed             GTM                                                                                            Ther Ex             Tendon gliding HEP            Alphabet  HEP                                                                                          Ther Activity                                       Gait Training                                       Modalities

## 2024-11-18 ENCOUNTER — EVALUATION (OUTPATIENT)
Dept: PHYSICAL THERAPY | Facility: CLINIC | Age: 39
End: 2024-11-18
Payer: COMMERCIAL

## 2024-11-18 DIAGNOSIS — R26.89 GAIT, ANTALGIC: Primary | ICD-10-CM

## 2024-11-18 PROCEDURE — 97110 THERAPEUTIC EXERCISES: CPT

## 2024-11-18 PROCEDURE — 97162 PT EVAL MOD COMPLEX 30 MIN: CPT

## 2024-11-18 NOTE — LETTER
2024    Gabriela Spivey DPM  [address] [address]    Patient: Myla Philip   YOB: 1985   Date of Visit: 2024     Encounter Diagnosis     ICD-10-CM    1. Gait, antalgic  R26.89           Dear Dr. Spivey:    Thank you for your recent referral of Myla Philip. Please review the attached evaluation summary from Myla's recent visit.     Please verify that you agree with the plan of care by signing the attached order.     If you have any questions or concerns, please do not hesitate to call.     I sincerely appreciate the opportunity to share in the care of one of your patients and hope to have another opportunity to work with you in the near future.       Sincerely,    Gustavo Antonio, PT      Referring Provider:      I certify that I have read the below Plan of Care and certify the need for these services furnished under this plan of treatment while under my care.                    Gabriela Spivey DPM  [address] [address]  Via Fax: [phone]          PT Evaluation     Today's date: 2024  Patient name: Myla Philip  : 1985  MRN: [id number]  Referring provider: Gabriela Spivey DPM  Dx:   Encounter Diagnosis     ICD-10-CM    1. Gait, antalgic  R26.89           Start Time: 0816  Stop Time: 0900  Total time in clinic (min): 44 minutes    Assessment  Impairments: abnormal gait, abnormal or restricted ROM, activity intolerance, impaired physical strength, lacks appropriate home exercise program, pain with function, poor body mechanics, unable to perform ADL, participation limitations and activity limitations  Symptom irritability: high    Assessment details: Pt is a 39 y.o. F that presents to PT with antalgic gait with R foot pain. She has current limitations of R ankle AROM, strength, and irritability with WB. These limitations reduce her ability to ambulate, drive, and complete ADLs. Discussed with pt possibility of  "CRPS being probable cause, but will be working diagnosis. Pt was educated of an HEP and completed the exercises to demonstrate understanding. Pt would benefit from skilled PT to accomplish the following goals.      Goals  STG: In 4 weeks:  1. Pt will safely complete HEP independently.  2. Pt will obtain R ankle AROM DF >0 degrees.  3. Pt will obtain worse NPRS of R ankle <5/10.  LTG: To be met at discharge:  1. Pt will report of being able to ambulate >200 ft with NPRS <3/10.  2. Pt will obtain R ankle MMT >4/5 for DF and PF.  3. Pt will report being able to drive with NPRS <3/10.  4. Pt will obtain a FOTO score of >58/100.      Plan  Patient would benefit from: skilled physical therapy    Planned therapy interventions: functional ROM exercises, home exercise program, therapeutic exercise, therapeutic activities, stretching, strengthening, patient/caregiver education, neuromuscular re-education, manual therapy, joint mobilization and graded motor    Frequency: 2x week  Duration in weeks: 12  Plan of Care beginning date: 11/18/2024  Plan of Care expiration date: 2/7/2025  Treatment plan discussed with: patient        Subjective Evaluation    History of Present Illness  Mechanism of injury: Around 5 weeks, she kicked her foot into a hard box. She feels like she is stepping on glass at times when walking. The foot can change colors at times. She can step on her bare foot, but it can be hard to walk and the pain can shoot up when she sits back down. The severity can change day to day. The pain runs from her big toe into her heel and compression makes the pain worse. She denies numbness into the foot. She can tell sharp vs dull in her foot. She does report increased back pain since having this injury. She is going for EMG study later today. She was given steroids to help with swelling and notices significant changes since on the medication.   Patient Goals  Patient goal: \"I want to be able to walk, drive, wear my shoes " "with no pain\" \"I want to get rid of the pain\"  Pain  Current pain ratin  At best pain ratin  Quality: dull ache and sharp  Relieving factors: heat          Objective     Tenderness     Right Ankle/Foot   Tenderness in the first metatarsal head.     Neurological Testing     Sensation     Ankle/Foot   Left Ankle/Foot   Intact: light touch    Right Ankle/Foot   Intact: light touch     Active Range of Motion   Left Ankle/Foot   Dorsiflexion (ke): 5 degrees   Plantar flexion: 50 degrees   Inversion: 30 degrees   Eversion: 10 degrees     Right Ankle/Foot   Dorsiflexion (ke): -20 degrees   Plantar flexion: 40 degrees   Inversion: 30 degrees   Eversion: 5 degrees     Strength/Myotome Testing     Left Ankle/Foot   Dorsiflexion: 5  Inversion: 4  Eversion: 4    Tests     Right Ankle/Foot   Negative for Danielle and Tinel's sign (tarsal tunnel).              Precautions: N/A  POC expires Unit limit Auth Expiration date PT/OT/ST + Visit Limit?   25 4 N/A 60 VISITS                           Visit/Unit Tracking  AUTH Status:  Date               N/A Used 1               Remaining                  [email or web address]  Access Code: [email or web address]    Manuals                                                                 Neuro Re-Ed             GTM                                                                                            Ther Ex             Tendon gliding HEP            Alphabet  HEP                                                                                          Ther Activity                                       Gait Training                                       Modalities                                                            "

## 2024-11-19 NOTE — PROGRESS NOTES
Daily Note     Today's date: 2024  Patient name: Myla Philip  : 1985  MRN: [id number]  Referring provider: Gabriela Spivey DPM  Dx:   Encounter Diagnosis     ICD-10-CM    1. Gait, antalgic  R26.89           Start Time: 1145  Stop Time: 1225  Total time in clinic (min): 40 minutes    Subjective: She reports of decreased pain that was TTP on bottom of first met head. EMG study came back with nothing significant.       Objective: See treatment diary below      Assessment: Tolerated treatment well. With GTM, she had decreased accuracy and time recognizing R feet. With grid placement, she had an initial hard time calling out the correct number, but eventually was able to correctly identify the correct corresponding grid number. Pt was educated to try old shoes in R foot since there is no hypersensitivity of pain as the new shoe had a hard medial material. 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 Patient would benefit from continued PT.      Plan: Continue per plan of care.      Precautions: N/A  POC expires Unit limit Auth Expiration date PT/OT/ST + Visit Limit?   25 4 N/A 60 VISITS                           Visit/Unit Tracking  AUTH Status:  Date              N/A Used 1 2              Remaining  59 58               [email or web address]  Access Code: [email or web address]    Manuals                                                                Neuro Re-Ed             GTM   20 mins           Pt education  DP           Grid  10 mins                                                               Ther Ex             Tendon gliding HEP            Alphabet  HEP                                                                                          Ther Activity                                       Gait Training                                       Modalities

## 2024-11-20 ENCOUNTER — OFFICE VISIT (OUTPATIENT)
Dept: PHYSICAL THERAPY | Facility: CLINIC | Age: 39
End: 2024-11-20
Payer: COMMERCIAL

## 2024-11-20 DIAGNOSIS — R26.89 GAIT, ANTALGIC: Primary | ICD-10-CM

## 2024-11-20 PROCEDURE — 97112 NEUROMUSCULAR REEDUCATION: CPT

## 2024-11-26 ENCOUNTER — OFFICE VISIT (OUTPATIENT)
Dept: PHYSICAL THERAPY | Facility: CLINIC | Age: 39
End: 2024-11-26
Payer: COMMERCIAL

## 2024-11-26 DIAGNOSIS — R26.89 GAIT, ANTALGIC: Primary | ICD-10-CM

## 2024-11-26 PROCEDURE — 97110 THERAPEUTIC EXERCISES: CPT

## 2024-11-26 PROCEDURE — 97140 MANUAL THERAPY 1/> REGIONS: CPT

## 2024-11-26 PROCEDURE — 97112 NEUROMUSCULAR REEDUCATION: CPT

## 2024-11-26 NOTE — PROGRESS NOTES
Daily Note     Today's date: 2024  Patient name: Myla Philip  : 1985  MRN: [id number]  Referring provider: Gabriela Spivey DPM  Dx:   Encounter Diagnosis     ICD-10-CM    1. Gait, antalgic  R26.89           Start Time: 0755  Stop Time: 0845  Total time in clinic (min): 50 minutes    Subjective: Pt reports that she had increased pain along the pad of her R foot and that she started to have increase symptoms into the L foot.       Objective: See treatment diary below      Assessment: Tolerated treatment well. She had increased tenderness along FHL and the plantar side of her R foot. She had decreased sensation along plantar aspect of the foot on the R vs L foot. She was introduced to ankle isometrics and responded well to DF, PF, and eversion, but had pain with inversion. Pt was introduced to mental imagery with R foot movement and was educated to continue with mental imagery at home. She was given an updated HEP and completed exercises to demonstrate understanding. Patient would benefit from continued PT.      Plan: Continue per plan of care.      Precautions: N/A  POC expires Unit limit Auth Expiration date PT/OT/ST + Visit Limit?   25 4 N/A 60 VISITS                           Visit/Unit Tracking  AUTH Status:  Date             N/A Used 1 2 3             Remaining  59 58 57              [email or web address]  Access Code: [email or web address]    Manuals           STM   6 mins          P-A glide   Gr 3 4 mins                                    Neuro Re-Ed             GTM   20 mins 10 mins          Pt education  DP DP          Grid  10 mins 4 mins                                                               Ther Ex             Tendon gliding HEP  5 mins          Alphabet  HEP  3 mins          Ankle DF isometric    X10 10sh          Ankle PF isometric   X10 10sh                                                              Ther Activity                                        Gait Training                                       Modalities

## 2024-11-27 ENCOUNTER — OFFICE VISIT (OUTPATIENT)
Dept: PHYSICAL THERAPY | Facility: CLINIC | Age: 39
End: 2024-11-27
Payer: COMMERCIAL

## 2024-11-27 DIAGNOSIS — R26.89 GAIT, ANTALGIC: Primary | ICD-10-CM

## 2024-11-27 PROCEDURE — 97110 THERAPEUTIC EXERCISES: CPT

## 2024-11-27 PROCEDURE — 97140 MANUAL THERAPY 1/> REGIONS: CPT

## 2024-11-27 PROCEDURE — 97112 NEUROMUSCULAR REEDUCATION: CPT

## 2024-11-27 NOTE — PROGRESS NOTES
Daily Note     Today's date: 2024  Patient name: Myla Philip  : 1985  MRN: [id number]  Referring provider: Gabriela Spivey DPM  Dx:   Encounter Diagnosis     ICD-10-CM    1. Gait, antalgic  R26.89           Start Time: 844  Stop Time: 930  Total time in clinic (min): 46 minutes    Subjective: She was able to walk around for 15 minutes with shoes on and the intensity of the discomfort increased. She also noticed that when she lays the side of her R foot, that the lateral aspect of it becomes tingling.     Objective: See treatment diary below MMT: L knee extension: 5/5  R knee extension: 3+/5      Assessment: Tolerated treatment well. With PF with inversion and DF with great toe ext, she experienced increased pain in medial aspect of the foot. With tendon glides, she had discomfort with DF. For alphabet exercise, she reported too much discomfort with completion. Isometrics were effective as she had no symptoms with completion. Updated her HEP and she completed exercises to demonstrate understanding. She was given LAQ to work on quad strengthening, but had increased discomfort in R foot care home through completion. She was educated to keep a diary of both her pain and discomfort levels with walking. Patient would benefit from continued PT.      Plan: Continue per plan of care.      Precautions: N/A  POC expires Unit limit Auth Expiration date PT/OT/ST + Visit Limit?   25 4 N/A 60 VISITS                           Visit/Unit Tracking  AUTH Status:  Date            N/A Used 1 2 3 4            Remaining  59 58 57 56             [email or web address]  Access Code: [email or web address]    Manuals          STM   6 mins 4 mins         P-A glide   Gr 3 4 mins Gr 2 5 mins                                   Neuro Re-Ed             GTM   20 mins 10 mins          Pt education  DP DP DP         Grid  10 mins 4 mins                                                                Ther Ex             Tendon gliding HEP  5 mins 6 mins         Alphabet  HEP  3 mins 3 mins         Ankle DF isometric    X10 10sh X10 10sh         Ankle PF isometric   X10 10sh X10 10sh         Ankle Inv isometric    X10 10sh         LAQ    X5                                    Ther Activity                                       Gait Training                                       Modalities

## 2024-12-03 ENCOUNTER — APPOINTMENT (OUTPATIENT)
Dept: PHYSICAL THERAPY | Facility: CLINIC | Age: 39
End: 2024-12-03
Payer: COMMERCIAL

## 2024-12-05 ENCOUNTER — OFFICE VISIT (OUTPATIENT)
Dept: PHYSICAL THERAPY | Facility: CLINIC | Age: 39
End: 2024-12-05
Payer: COMMERCIAL

## 2024-12-05 DIAGNOSIS — R26.89 GAIT, ANTALGIC: Primary | ICD-10-CM

## 2024-12-05 PROCEDURE — 97110 THERAPEUTIC EXERCISES: CPT

## 2024-12-05 PROCEDURE — 97112 NEUROMUSCULAR REEDUCATION: CPT

## 2024-12-05 PROCEDURE — 97140 MANUAL THERAPY 1/> REGIONS: CPT

## 2024-12-05 NOTE — PROGRESS NOTES
Daily Note     Today's date: 2024  Patient name: Myla Philip  : 1985  MRN: [id number]  Referring provider: Gabriela Spivey DPM  Dx:   Encounter Diagnosis     ICD-10-CM    1. Gait, antalgic  R26.89           Start Time: 1013  Stop Time: 1101  Total time in clinic (min): 48 minutes    Subjective: She reports that is off the Meloxicam and has noticed that there is a low level tingling.      Objective: See treatment diary below      Assessment: Tolerated treatment well. Educated pt about nociplastic pain that may be cause of her symptoms in R foot. Discussed nerve pain normal activity level and how due to recent injury is higher. Attempted concentric strengthening of R foot with YTB and she responded well to exercises as she reported of fatigue after completion. This session, she was able to complete LAQ with only having symptoms when foot was in a dependable position. Desentization was successful for decreasing her symptoms in her R foot. She was educated to bring in a list of her top 5 stressors for next session as increased stress can increase nerve pain activity level. Patient would benefit from continued PT.      Plan: Continue per plan of care.      Precautions: N/A  POC expires Unit limit Auth Expiration date PT/OT/ST + Visit Limit?   25 4 N/A 60 VISITS                           Visit/Unit Tracking  AUTH Status:  Date           N/A Used 1 2 3 4 5           Remaining  59 58 57 56 55            [email or web address]  Access Code: [email or web address]    Manuals         STM   6 mins 4 mins         P-A glide   Gr 3 4 mins Gr 2 5 mins         Desentization                           Neuro Re-Ed             GTM   20 mins 10 mins          Pt education  DP DP DP DP        Grid  10 mins 4 mins                                                               Ther Ex             Tendon gliding HEP  5 mins 6 mins         Alphabet  HEP  3 mins 3 mins          Ankle DF isometric    X10 10sh X10 10sh         Ankle PF isometric   X10 10sh X10 10sh         Ankle Inv isometric    X10 10sh         LAQ    X5  X20        Ankle 3-way      YTB x20 ea.                     Ther Activity                                       Gait Training                                       Modalities

## 2024-12-08 NOTE — PROGRESS NOTES
Daily Note     Today's date: 2024  Patient name: Myla Philip  : 1985  MRN: [id number]  Referring provider: Gabriela Spivey DPM  Dx:   Encounter Diagnosis     ICD-10-CM    1. Gait, antalgic  R26.89           Start Time: 1100  Stop Time: 1140  Total time in clinic (min): 40 minutes    Subjective: She reports that she has low level tingling and increased mobility. She also reports after last session she had a really bad flare up in her R foot.       Objective: See treatment diary below      Assessment: Tolerated treatment well. Today's session was focused on neuro pain science education and how stress and lack of sleep can affect pain levels. She was instructed to plan 5-10 minutes out of the day to make time for her hobbies to reduce stress levels. TB 4-way was completed and will report back NV to discuss. Patient would benefit from continued PT.      Plan: Continue per plan of care.      Precautions: N/A  POC expires Unit limit Auth Expiration date PT/OT/ST + Visit Limit?   25 4 N/A 60 VISITS                           Visit/Unit Tracking  AUTH Status:  Date          N/A Used 1 2 3 4 5 6          Remaining  59 58 57 56 55 54           [email or web address]  Access Code: [email or web address]    Manuals        STM   6 mins 4 mins         P-A glide   Gr 3 4 mins Gr 2 5 mins         Desentization                           Neuro Re-Ed             GTM   20 mins 10 mins          Pt education  DP DP DP DP DP       Grid  10 mins 4 mins                                                               Ther Ex             Tendon gliding HEP  5 mins 6 mins         Alphabet  HEP  3 mins 3 mins         Ankle DF isometric    X10 10sh X10 10sh         Ankle PF isometric   X10 10sh X10 10sh         Ankle Inv isometric    X10 10sh         LAQ    X5  X20        Ankle 3-way      YTB x20 ea. YTB x20 ea.                     Ther Activity                                        Gait Training                                       Modalities

## 2024-12-09 ENCOUNTER — OFFICE VISIT (OUTPATIENT)
Dept: PHYSICAL THERAPY | Facility: CLINIC | Age: 39
End: 2024-12-09
Payer: COMMERCIAL

## 2024-12-09 DIAGNOSIS — R26.89 GAIT, ANTALGIC: Primary | ICD-10-CM

## 2024-12-09 PROCEDURE — 97110 THERAPEUTIC EXERCISES: CPT

## 2024-12-09 PROCEDURE — 97112 NEUROMUSCULAR REEDUCATION: CPT

## 2024-12-11 ENCOUNTER — OFFICE VISIT (OUTPATIENT)
Dept: PHYSICAL THERAPY | Facility: CLINIC | Age: 39
End: 2024-12-11
Payer: COMMERCIAL

## 2024-12-11 DIAGNOSIS — R26.89 GAIT, ANTALGIC: Primary | ICD-10-CM

## 2024-12-11 PROCEDURE — 97112 NEUROMUSCULAR REEDUCATION: CPT

## 2024-12-11 PROCEDURE — 97110 THERAPEUTIC EXERCISES: CPT

## 2024-12-11 NOTE — PROGRESS NOTES
Daily Note     Today's date: 2024  Patient name: Myla Philip  : 1985  MRN: [id number]  Referring provider: Gabriela Spivey DPM  Dx:   Encounter Diagnosis     ICD-10-CM    1. Gait, antalgic  R26.89           Start Time: 075  Stop Time: 0845  Total time in clinic (min): 46 minutes    Subjective: She reports that she is feeling a lot better with her R foot, and that she has been able to wear her sock that felt like glass to her 3 weeks ago. Now the pain is localized to underneath her first met head.       Objective: See treatment diary below      Assessment: Tolerated treatment well. With increase in resistance with ankle 4-way, she had reports of fatigue after completion, but had increased tingling after completion of the PF movement. She was able to tolerate bridges with no increase in symptoms in the R foot. Standing calf stretch was attempted and she responded well to stretches as she had no reports of pain with WB into the RLE. Patient would benefit from continued PT.      Plan: Continue per plan of care.      Precautions: N/A  POC expires Unit limit Auth Expiration date PT/OT/ST + Visit Limit?   25 4 N/A 60 VISITS                           Visit/Unit Tracking  AUTH Status:  Date         N/A Used 1 2 3 4 5 6 7         Remaining  59 58 57 56 55 54 53          [email or web address]  Access Code: [email or web address]    Manuals       STM   6 mins 4 mins         P-A glide   Gr 3 4 mins Gr 2 5 mins         Desentization                           Neuro Re-Ed             GTM   20 mins 10 mins          Pt education  DP DP DP DP DP DP      Grid  10 mins 4 mins                                                               Ther Ex             Tendon gliding HEP  5 mins 6 mins         Alphabet  HEP  3 mins 3 mins         Ankle DF isometric    X10 10sh X10 10sh         Ankle PF isometric   X10 10sh X10 10sh         Ankle Inv  "isometric    X10 10sh         LAQ    X5  X20  2x10 5sh      Ankle 4-way      YTB x20 ea. YTB x20 ea.  RTB x15 ea.      Great toe isometric       X10 10sh      Bridges        2x10       Standing calf stretch       3x30\"      Ther Activity                                       Gait Training                                       Modalities                                                      "

## 2024-12-15 NOTE — PROGRESS NOTES
"Daily Note     Today's date: 12/15/2024  Patient name: Myla Philip  : 1985  MRN: [id number]  Referring provider: Gabriela Spivey DPM  Dx:   Encounter Diagnosis     ICD-10-CM    1. Gait, antalgic  R26.89                      Subjective: ***      Objective: See treatment diary below      Assessment: Tolerated treatment {Tolerated treatment :7519285955}. Patient {assessment:9291211244}      Plan: {PLAN:8545661412}     Precautions: N/A  POC expires Unit limit Auth Expiration date PT/OT/ST + Visit Limit?   25 4 N/A 60 VISITS                           Visit/Unit Tracking  AUTH Status:  Date        N/A Used 1 2 3 4 5 6 7 8        Remaining  59 58 57 56 55 54 53 52         [email or web address]  Access Code: [email or web address]    Manuals      STM   6 mins 4 mins         P-A glide   Gr 3 4 mins Gr 2 5 mins         Desentization                           Neuro Re-Ed             GTM   20 mins 10 mins          Pt education  DP DP DP DP DP DP      Grid  10 mins 4 mins                                                               Ther Ex             Tendon gliding HEP  5 mins 6 mins         Alphabet  HEP  3 mins 3 mins         Ankle DF isometric    X10 10sh X10 10sh         Ankle PF isometric   X10 10sh X10 10sh         Ankle Inv isometric    X10 10sh         LAQ    X5  X20  2x10 5sh      Ankle 4-way      YTB x20 ea. YTB x20 ea.  RTB x15 ea.      Great toe isometric       X10 10sh      Bridges        2x10       Standing calf stretch       3x30\"      Ther Activity                                       Gait Training                                       Modalities                                                        "

## 2024-12-16 ENCOUNTER — APPOINTMENT (OUTPATIENT)
Dept: PHYSICAL THERAPY | Facility: CLINIC | Age: 39
End: 2024-12-16
Payer: COMMERCIAL

## 2024-12-16 DIAGNOSIS — R26.89 GAIT, ANTALGIC: Primary | ICD-10-CM

## 2024-12-18 NOTE — PROGRESS NOTES
Daily Note     Today's date: 2024  Patient name: Myla Philip  : 1985  MRN: [id number]  Referring provider: Gabriela Spivey DPM  Dx:   Encounter Diagnosis     ICD-10-CM    1. Gait, antalgic  R26.89           Start Time: 1018  Stop Time: 1100  Total time in clinic (min): 42 minutes    Subjective: She reports that she is back to driving and has reported no issues. She also has noticed pain in the medial aspect of her R foot that also travels into her heel.       Objective: See treatment diary below      Assessment: Tolerated treatment well. She was introduced to towel scrunches and doming and responded well to exercise as she had reports of fatigue after completion. She also had reports of increased numbness and tingling with prolonged R rotation at work, but the tingling feels different than her usual symptoms. L/s AROM was assessed and is probable cause for increased tingling in R foot may be due to chronic low back issues, which were discussed and may be evaluated at a later time. She was Patient would benefit from continued PT.      Plan: Continue per plan of care.      Precautions: N/A  POC expires Unit limit Auth Expiration date PT/OT/ST + Visit Limit?   25 4 N/A 60 VISITS                           Visit/Unit Tracking  AUTH Status:  Date        N/A Used 1 2 3 4 5 6 7 8        Remaining  59 58 57 56 55 54 53 52         [email or web address]  Access Code: [email or web address]    Manuals      STM   6 mins 4 mins         P-A glide   Gr 3 4 mins Gr 2 5 mins         Desentization                           Neuro Re-Ed             GTM   20 mins 10 mins          Pt education  DP DP DP DP DP DP      Grid  10 mins 4 mins           SLS        NV                                            Ther Ex             Tendon gliding HEP  5 mins 6 mins         Alphabet  HEP  3 mins 3 mins         Ankle DF isometric    X10 10sh  "X10 10sh         Ankle PF isometric   X10 10sh X10 10sh         Ankle Inv isometric    X10 10sh         LAQ    X5  X20  2x10 5sh      Ankle 4-way      YTB x20 ea. YTB x20 ea.  RTB x15 ea.      Great toe isometric       X10 10sh X20 10sh     Bridges        2x10       Standing calf stretch       3x30\"      Toe crunches        5x10      Towel scrunches         3x1'     Doming        X10 10sh     Ther Activity                                       Gait Training                                       Modalities                                                          "

## 2024-12-19 ENCOUNTER — OFFICE VISIT (OUTPATIENT)
Dept: PHYSICAL THERAPY | Facility: CLINIC | Age: 39
End: 2024-12-19
Payer: COMMERCIAL

## 2024-12-19 DIAGNOSIS — R26.89 GAIT, ANTALGIC: Primary | ICD-10-CM

## 2024-12-19 PROCEDURE — 97112 NEUROMUSCULAR REEDUCATION: CPT

## 2024-12-19 PROCEDURE — 97110 THERAPEUTIC EXERCISES: CPT

## 2024-12-24 NOTE — PROGRESS NOTES
"Daily Note     Today's date: 2024  Patient name: Myla Philip  : 1985  MRN: [id number]  Referring provider: Gabriela Spivey DPM  Dx:   Encounter Diagnosis     ICD-10-CM    1. Gait, antalgic  R26.89           Start Time: 935  Stop Time: 1013  Total time in clinic (min): 38 minutes    Subjective: She reports that her medication may be affecting her symptoms in her foot.       Objective: See treatment diary below      Assessment: Tolerated treatment well. Reviewed medication list and educated pt to discuss medications with PCP. She had moderate amount of swaying with SLS on foam and increased her sensitivity in her R foot, rating it a 4/10. She was given an updated HEP to continue with R foot intrinsic muscle strengthening. Patient demonstrated fatigue post treatment and would benefit from continued PT.      Plan: Continue per plan of care.      Precautions: N/A  POC expires Unit limit Auth Expiration date PT/OT/ST + Visit Limit?   25 4 N/A 60 VISITS                           Visit/Unit Tracking  AUTH Status:  Date       N/A Used 1 2 3 4 5 6 7 8 9       Remaining  59 58 57 56 55 54 53 52 51        [email or web address]  Access Code: [email or web address]    Manuals     STM   6 mins 4 mins         P-A glide   Gr 3 4 mins Gr 2 5 mins         Desentization                           Neuro Re-Ed             GTM   20 mins 10 mins          Pt education  DP DP DP DP DP DP DP DP    Grid  10 mins 4 mins           SLS        NV 3x30\" on foam                                           Ther Ex             Tendon gliding HEP  5 mins 6 mins         Alphabet  HEP  3 mins 3 mins         Ankle DF isometric    X10 10sh X10 10sh         Ankle PF isometric   X10 10sh X10 10sh         Ankle Inv isometric    X10 10sh         LAQ    X5  X20  2x10 5sh      Ankle 4-way      YTB x20 ea. YTB x20 ea.  RTB x15 ea.      Great toe " "isometric       X10 10sh X20 10sh X20 10sh    Bridges        2x10       Standing calf stretch       3x30\"      Toe crunches        5x10  5x10     Towel scrunches         3x1' 3x1'     Doming        X10 10sh     Ther Activity                                       Gait Training                                       Modalities                                                            "

## 2024-12-26 ENCOUNTER — OFFICE VISIT (OUTPATIENT)
Dept: PHYSICAL THERAPY | Facility: CLINIC | Age: 39
End: 2024-12-26
Payer: COMMERCIAL

## 2024-12-26 DIAGNOSIS — R26.89 GAIT, ANTALGIC: Primary | ICD-10-CM

## 2024-12-26 PROCEDURE — 97112 NEUROMUSCULAR REEDUCATION: CPT

## 2024-12-26 PROCEDURE — 97110 THERAPEUTIC EXERCISES: CPT

## 2025-01-03 ENCOUNTER — OFFICE VISIT (OUTPATIENT)
Dept: PHYSICAL THERAPY | Facility: CLINIC | Age: 40
End: 2025-01-03
Payer: COMMERCIAL

## 2025-01-03 DIAGNOSIS — R26.89 GAIT, ANTALGIC: Primary | ICD-10-CM

## 2025-01-03 PROCEDURE — 97110 THERAPEUTIC EXERCISES: CPT

## 2025-01-03 NOTE — PROGRESS NOTES
PT Re-Evaluation     Today's date: 1/3/2025  Patient name: Myla Philip  : 1985  MRN: [id number]  Referring provider: Gabriela Spivey DPM  Dx:   Encounter Diagnosis     ICD-10-CM    1. Gait, antalgic  R26.89           Start Time: 0950  Stop Time: 1030  Total time in clinic (min): 40 minutes    Assessment  Impairments: abnormal gait, abnormal or restricted ROM, activity intolerance, impaired physical strength, lacks appropriate home exercise program, pain with function, poor body mechanics, unable to perform ADL, participation limitations and activity limitations  Symptom irritability: high    Assessment details: Pt is a 39 y.o. F that presents to PT with antalgic gait with R foot pain. She has current limitations of R ankle AROM, strength, and irritability with WB. These limitations reduce her ability to ambulate, drive, and complete ADLs. Discussed with pt possibility of CRPS being probable cause, but will be working diagnosis. Pt was educated of an HEP and completed the exercises to demonstrate understanding. Pt would benefit from skilled PT to accomplish the following goals.     1/3: Pt has been attending PT for 10 visits for antaligic gait with R foot pain and has made improvements with R ankle AROM, strength, and tolerance to activity. Although with these improvements, she still has decrease global strength in the RLE and still experiences discomfort in the R foot with WB activities. She would still benefit from skilled PT to accomplish the following goals.     Goals  STG: In 4 weeks:  1. Pt will safely complete HEP independently.-MET  2. Pt will obtain R ankle AROM DF >0 degrees.-MET  3. Pt will obtain worse NPRS of R ankle <5/10.-MET  LTG: To be met at discharge:  1. Pt will report of being able to ambulate >200 ft with NPRS <3/10.-PROGRESSING  2. Pt will obtain R ankle MMT >4/5 for DF and PF.-MET  3. Pt will report being able to drive with NPRS <3/10.-MET  4. Pt will obtain a FOTO score of >58/100.   -PROGRESSING  NEW GOALS AS OF 1/3:  1. Pt will obtain R hip abduction MMT >4/5.   2. Pt will be able to report minimal to no discomfort in the R foot with driving >40 minutes.     Plan  Patient would benefit from: skilled physical therapy    Planned therapy interventions: functional ROM exercises, home exercise program, therapeutic exercise, therapeutic activities, stretching, strengthening, patient/caregiver education, neuromuscular re-education, manual therapy, joint mobilization and graded motor    Frequency: 2x week  Duration in weeks: 6  Plan of Care beginning date: 11/18/2024  Plan of Care expiration date: 2/14/2025  Treatment plan discussed with: patient        Subjective Evaluation    History of Present Illness  Mechanism of injury: Around 5 weeks, she kicked her foot into a hard box. She feels like she is stepping on glass at times when walking. The foot can change colors at times. She can step on her bare foot, but it can be hard to walk and the pain can shoot up when she sits back down. The severity can change day to day. The pain runs from her big toe into her heel and compression makes the pain worse. She denies numbness into the foot. She can tell sharp vs dull in her foot. She does report increased back pain since having this injury. She is going for EMG study later today. She was given steroids to help with swelling and notices significant changes since on the medication.     1/3: She feels like she is 60% through her PT goals. She reports like she has improved as she can wear her shoes now and it feels like her foot now, swelling has been reduced, and reduced symptoms. She reports of still being limited by her persisting symptoms and being able to ambulate long distances and symptoms are day by day. She feels like she cannot do a full day of work as she has a hard time sitting in the car for prolonged periods and lacks stability. With walking she feels like she is dragging her foot. Recently she  "reports of no more pain, but still having discomfort.   Patient Goals  Patient goal: \"I want to be able to walk, drive, wear my shoes with no pain\" \"I want to get rid of the pain\"  Pain  No pain reported  Current pain ratin  At best pain ratin  At worst pain ratin  Quality: discomfort  Relieving factors: heat          Objective     Neurological Testing     Sensation     Ankle/Foot   Left Ankle/Foot   Intact: light touch    Right Ankle/Foot   Intact: light touch     Active Range of Motion   Left Ankle/Foot   Dorsiflexion (ke): 5 degrees   Plantar flexion: 50 degrees   Inversion: 30 degrees   Eversion: 10 degrees     Right Ankle/Foot   Dorsiflexion (ke): 3 degrees   Plantar flexion: 61 degrees   Inversion: 35 degrees   Eversion: 5 degrees     Strength/Myotome Testing     Right Hip   Planes of Motion   Flexion: 4  Extension: 3+  Abduction: 3+    Right Knee   Flexion: 3+  Extension: 4+    Left Ankle/Foot   Dorsiflexion: 5  Inversion: 4  Eversion: 4    Right Ankle/Foot   Dorsiflexion: 5  Inversion: 4  Eversion: 5    Tests     Right Ankle/Foot   Negative for Danielle and Tinel's sign (tarsal tunnel).              Precautions: N/A  POC expires Unit limit Auth Expiration date PT/OT/ST + Visit Limit?   25 4 N/A 60 VISITS                           Visit/Unit Tracking  AUTH Status:  Date               N/A Used 1               Remaining                  [email or web address]  Access Code: [email or web address]    Manuals 11/18 11/20 11/26 11/27 12/5 12/9 12/11 12/18 12/26 1/3   STM   6 mins 4 mins         P-A glide   Gr 3 4 mins Gr 2 5 mins         Desentization                           Neuro Re-Ed             GTM   20 mins 10 mins          Pt education  DP DP DP DP DP DP DP DP DP   Grid  10 mins 4 mins           SLS        NV 3x30\" on foam                                           Ther Ex             Tendon gliding HEP  5 mins 6 mins         Alphabet  HEP  3 mins 3 mins         Ankle DF isometric    X10 " "10sh X10 10sh         Ankle PF isometric   X10 10sh X10 10sh         Ankle Inv isometric    X10 10sh         LAQ    X5  X20  2x10 5sh      Ankle 4-way      YTB x20 ea. YTB x20 ea.  RTB x15 ea.      Great toe isometric       X10 10sh X20 10sh X20 10sh    Bridges        2x10       Standing calf stretch       3x30\"      Toe crunches        5x10  5x10     Towel scrunches         3x1' 3x1'     Doming        X10 10sh     Heel raises           2x8 with big toe extension   Bike           NV   Re-eval           DP   Side stepping          NV   Ther Activity                                       Gait Training                                       Modalities                                            "

## 2025-01-06 ENCOUNTER — OFFICE VISIT (OUTPATIENT)
Dept: PHYSICAL THERAPY | Facility: CLINIC | Age: 40
End: 2025-01-06
Payer: COMMERCIAL

## 2025-01-06 DIAGNOSIS — R26.89 GAIT, ANTALGIC: Primary | ICD-10-CM

## 2025-01-06 PROCEDURE — 97110 THERAPEUTIC EXERCISES: CPT

## 2025-01-06 NOTE — PROGRESS NOTES
"Daily Note     Today's date: 2025  Patient name: Myla Philip  : 1985  MRN: [id number]  Referring provider: Gabriela Spivey DPM  Dx:   Encounter Diagnosis     ICD-10-CM    1. Gait, antalgic  R26.89           Start Time: 1437  Stop Time: 1517  Total time in clinic (min): 40 minutes    Subjective: She reports of some soreness after last session, but decreased overall.       Objective: See treatment diary below      Assessment: Tolerated treatment well. With LE strengthening she responded well to exercises expect for reporting increased pain in R ankle with SLS with rebounder. Educated pt that will reassess how she feels NV to incorporate exercises for home. Patient exhibited good technique with therapeutic exercises and would benefit from continued PT.      Plan: Continue per plan of care.      Precautions: N/A  POC expires Unit limit Auth Expiration date PT/OT/ST + Visit Limit?   25 4 N/A 60 VISITS                           Visit/Unit Tracking  AUTH Status:  Date               N/A Used 1               Remaining                  [email or web address]  Access Code: [email or web address]    Manuals 1/6    12/5 12/9 12/11 12/18 12/26 1/3   STM             P-A glide             Desentization                           Neuro Re-Ed             GTM              Pt education     DP DP DP DP DP DP   Grid             SLS        NV 3x30\" on foam    SLS rebounder  RMB x10 b/l                                      Ther Ex             Tendon gliding             Alphabet              Ankle DF isometric              Ankle PF isometric             Ankle Inv isometric             LAQ     X20  2x10 5sh      Ankle 4-way      YTB x20 ea. YTB x20 ea.  RTB x15 ea.      Great toe isometric       X10 10sh X20 10sh X20 10sh    Bridges        2x10       Standing calf stretch       3x30\"      Toe crunches        5x10  5x10     Towel scrunches         3x1' 3x1'     Doming        X10 10sh     Heel raises           2x8 with big " toe extension   Bike  6 mins         NV   Re-eval           DP   Side stepping Nora 16# x8 b/l          NV   Leg Press 95# 3x10            STS 10lb weight x10             Ther Activity                                       Gait Training                                       Modalities

## 2025-01-08 ENCOUNTER — OFFICE VISIT (OUTPATIENT)
Dept: PHYSICAL THERAPY | Facility: CLINIC | Age: 40
End: 2025-01-08
Payer: COMMERCIAL

## 2025-01-08 DIAGNOSIS — R26.89 GAIT, ANTALGIC: Primary | ICD-10-CM

## 2025-01-08 PROCEDURE — 97110 THERAPEUTIC EXERCISES: CPT

## 2025-01-08 NOTE — PROGRESS NOTES
"Daily Note     Today's date: 2025  Patient name: Myla Philip  : 1985  MRN: [id number]  Referring provider: Gabriela Spivey DPM  Dx:   Encounter Diagnosis     ICD-10-CM    1. Gait, antalgic  R26.89           Start Time: 1105  Stop Time: 1150  Total time in clinic (min): 45 minutes    Subjective: She reports of overall increased achy sensation over her R foot when barefoot.       Objective: See treatment diary below      Assessment: Tolerated treatment well. With lunges, she had increased fatigue around the 7th rep each side. She had reports of increased fatigue for her glut med compared to completing the side stepping last visit. She responded well to exercises as she reported of fatigue in BLE after completion of session. Patient would benefit from continued PT.      Plan: Continue per plan of care.      Precautions: N/A  POC expires Unit limit Auth Expiration date PT/OT/ST + Visit Limit?   25 4 N/A 60 VISITS                           Visit/Unit Tracking  AUTH Status:  Date               N/A Used 1               Remaining                  [email or web address]  Access Code: [email or web address]    Manuals 1/6 1/8   12/5 12/9 12/11 12/18 12/26 1/3   STM             P-A glide             Desentization                           Neuro Re-Ed             GTM              Pt education     DP DP DP DP DP DP   Grid             SLS        NV 3x30\" on foam    SLS rebounder  RMB x10 b/l            Star pattern  X5; x5 w/ foam                        Ther Ex             Tendon gliding             Alphabet              Ankle DF isometric              Ankle PF isometric             Ankle Inv isometric             LAQ     X20  2x10 5sh      Ankle 4-way      YTB x20 ea. YTB x20 ea.  RTB x15 ea.      Great toe isometric       X10 10sh X20 10sh X20 10sh    Bridges        2x10       Standing calf stretch       3x30\"      Toe crunches        5x10  5x10     Towel scrunches         3x1' 3x1'     Doming        X10 " 10sh                  Heel raises  2x10 with big toe ext         2x8 with big toe extension   Bike  6 mins 5 mins        NV   Re-eval           DP   Side stepping [place] 16# x8 b/l          NV   Leg Press 95# 3x10 135# x10; 155# 2x10            STS 10lb weight x10             Counter support lunges  X10 b/l           Ther Activity                                       Gait Training                                       Modalities

## 2025-01-13 NOTE — PROGRESS NOTES
"Daily Note     Today's date: 2025  Patient name: Myla Philip  : 1985  MRN: [id number]  Referring provider: Gabriela Spivey DPM  Dx:   Encounter Diagnosis     ICD-10-CM    1. Gait, antalgic  R26.89           Start Time: 847  Stop Time: 932  Total time in clinic (min): 45 minutes    Subjective: She reports burning sensation over the balls of her foot. She feels like the exercises last week irritated it and she did some of the exercises in her HEP and it made it feel better.       Objective: See treatment diary below      Assessment: Tolerated treatment well. Navicular drop test was assessed and found to be >1 cm and had (+) too many toes on the R foot indicating increased pronation on the R foot with WB. She was educated to continue with ankle doming in standing and if symptoms persisted to be seated. She was also educated to use relieving techniques of mulders that was completed during session to self relief symptoms. Patient would benefit from continued PT.      Plan: Continue per plan of care.      Precautions: N/A  POC expires Unit limit Auth Expiration date PT/OT/ST + Visit Limit?   25 4 N/A 60 VISITS                           Visit/Unit Tracking  AUTH Status:  Date               N/A Used 1               Remaining                  [email or web address]  Access Code: [email or web address]    Manuals 1/6 1/8 1/14  12/5 12/9 12/11 12/18 12/26 1/3   STM   5 mins          P-A glide             Desentization              Mulders   4 mins          Neuro Re-Ed             GTM              Pt education     DP DP DP DP DP DP   Grid             SLS        NV 3x30\" on foam    SLS rebounder  RMB x10 b/l            Star pattern  X5; x5 w/ foam                        Ther Ex             Tendon gliding             Alphabet              Ankle DF isometric              Ankle PF isometric             Ankle Inv isometric             LAQ     X20  2x10 5sh      Ankle 4-way      YTB x20 ea. YTB x20 ea.  RTB x15 " "ea.      Great toe isometric       X10 10sh X20 10sh X20 10sh    Bridges        2x10       Standing calf stretch   3x1'    3x30\"      Toe crunches        5x10  5x10     Towel scrunches         3x1' 3x1'     Doming   X10 10sh     X10 10sh     Navicular drop test   DP          Heel raises  2x10 with big toe ext         2x8 with big toe extension   Bike  6 mins 5 mins 5 mins       NV   Re-eval           DP   Side stepping [place] 16# x8 b/l          NV   Leg Press 95# 3x10 135# x10; 155# 2x10            STS 10lb weight x10             Counter support lunges  X10 b/l           Ther Activity                                       Gait Training                                       Modalities                                                "

## 2025-01-14 ENCOUNTER — OFFICE VISIT (OUTPATIENT)
Dept: PHYSICAL THERAPY | Facility: CLINIC | Age: 40
End: 2025-01-14
Payer: COMMERCIAL

## 2025-01-14 DIAGNOSIS — R26.89 GAIT, ANTALGIC: Primary | ICD-10-CM

## 2025-01-14 PROCEDURE — 97140 MANUAL THERAPY 1/> REGIONS: CPT

## 2025-01-14 PROCEDURE — 97110 THERAPEUTIC EXERCISES: CPT

## 2025-01-15 NOTE — PROGRESS NOTES
"Daily Note     Today's date: 2025  Patient name: Myla Philip  : 1985  MRN: [id number]  Referring provider: Gabriela Spivey DPM  Dx:   Encounter Diagnosis     ICD-10-CM    1. Gait, antalgic  R26.89           Start Time: 1105  Stop Time: 1145  Total time in clinic (min): 40 minutes    Subjective: She reports of symptoms in her foot regressing. She does report of increased stress in the past two weeks.       Objective: See treatment diary below      Assessment: Tolerated treatment well. Assessed L/s and R hip involvement with increased in symptoms in R foot. With R hip IR, she had increase in symptoms into the R foot that was relieved with rest. Vascular testing was also completed for arterial profusion with capillary refill and palpating posterior tib and dorsalis pedis and there was no difference between L vs R foot. Hip mobilizations were completed and she had reports of decreased sensation in the R foot. She had TTP over FHL at end of session with deep pressure. She was educated about increased stress and its correlation with nociplastic pain and was also educated to try and decrease stress at home to lower pain nerves activity level. Patient would benefit from continued PT.      Plan: Continue per plan of care.      Precautions: N/A  POC expires Unit limit Auth Expiration date PT/OT/ST + Visit Limit?   25 4 N/A 60 VISITS                           Visit/Unit Tracking  AUTH Status:  Date               N/A Used 1               Remaining                  [email or web address]  Access Code: [email or web address]    Manuals 1/6 1/8 1/14 1/16 12/5 12/9 12/11 12/18 12/26 1/3   STM   5 mins          P-A glide             Desentization              Mulders   4 mins          Hip post and inf and traction    15 mins         Arterial testing    DP         Neuro Re-Ed             GTM              Pt education    DP DP DP DP DP DP DP   Grid             SLS        NV 3x30\" on foam    SLS rebounder  RMB x10 " "b/l            Star pattern  X5; x5 w/ foam                        Ther Ex             Tendon gliding             Alphabet              Ankle DF isometric              Ankle PF isometric             Ankle Inv isometric             LAQ     X20  2x10 5sh      Ankle 4-way      YTB x20 ea. YTB x20 ea.  RTB x15 ea.      Great toe isometric       X10 10sh X20 10sh X20 10sh    Bridges        2x10       Standing calf stretch   3x1'    3x30\"      Toe crunches        5x10  5x10     Towel scrunches         3x1' 3x1'     Doming   X10 10sh     X10 10sh     Navicular drop test   DP          Heel raises  2x10 with big toe ext         2x8 with big toe extension   Bike  6 mins 5 mins 5 mins       NV   Re-eval           DP   Side stepping [place] 16# x8 b/l          NV   Leg Press 95# 3x10 135# x10; 155# 2x10            STS 10lb weight x10             Counter support lunges  X10 b/l           Ther Activity                                       Gait Training                                       Modalities                                                  "

## 2025-01-16 ENCOUNTER — OFFICE VISIT (OUTPATIENT)
Dept: PHYSICAL THERAPY | Facility: CLINIC | Age: 40
End: 2025-01-16
Payer: COMMERCIAL

## 2025-01-16 DIAGNOSIS — R26.89 GAIT, ANTALGIC: Primary | ICD-10-CM

## 2025-01-16 PROCEDURE — 97112 NEUROMUSCULAR REEDUCATION: CPT

## 2025-01-16 PROCEDURE — 97140 MANUAL THERAPY 1/> REGIONS: CPT

## 2025-01-17 NOTE — PROGRESS NOTES
"Daily Note     Today's date: 2025  Patient name: Myla Philip  : 1985  MRN: [id number]  Referring provider: Gabriela Spivey DPM  Dx:   Encounter Diagnosis     ICD-10-CM    1. Gait, antalgic  R26.89           Start Time: 1115  Stop Time: 1201  Total time in clinic (min): 46 minutes    Subjective: She reports of improvement in her R foot since last session. She was able to drive 45 minutes over the weekend.      Objective: See treatment diary below      Assessment: Tolerated treatment well. Fluctuating foot pain throughout session. She reported relieve with tendon glides. Introduced hip strengthening exercises and she had reported of total RLE soreness after completion. At end of session, she had reports of decreased \"foil\" sensation in R foot when donning her show and ambulating. She was educated to continue with ankle exercises at home and will focus on hip strengthening and mobilization NV to assess if it changes symptoms in R foot. Patient would benefit from continued PT.      Plan: Continue per plan of care.  Focus on hip for reducing symptoms in RLE.      Precautions: N/A  POC expires Unit limit Auth Expiration date PT/OT/ST + Visit Limit?   25 4 N/A 60 VISITS                           Visit/Unit Tracking  AUTH Status:  Date               N/A Used 1               Remaining                  [email or web address]  Access Code: [email or web address]    Manuals 1/6 1/8 1/14 1/16 1/20   12/18 12/26 1/3   STM   5 mins          P-A glide     4 mins        Desentization              Mulders   4 mins          Hip post and inf and traction    15 mins         Arterial testing    DP         Big toe P-A     4 mins         Neuro Re-Ed             GTM              Pt education    DP    DP DP DP   Grid             SLS        NV 3x30\" on foam    SLS rebounder  RMB x10 b/l            Star pattern  X5; x5 w/ foam                        Ther Ex             Tendon gliding     2x90 seconds        Alphabet        "       Ankle DF isometric              Ankle PF isometric             Ankle Inv isometric             LAQ             Ankle 4-way              Great toe isometric        X20 10sh X20 10sh    Bridges              Standing calf stretch   3x1'          Toe crunches        5x10  5x10     Towel scrunches         3x1' 3x1'     Doming   X10 10sh     X10 10sh     Navicular drop test   DP          Heel raises  2x10 with big toe ext    2x15 with big toe ext     2x8 with big toe extension   Bike  6 mins 5 mins 5 mins       NV   Re-eval           DP   Side stepping [place] 16# x8 b/l          NV   Leg Press 95# 3x10 135# x10; 155# 2x10            STS 10lb weight x10             Counter support lunges  X10 b/l           SLR abd     2x10         Prone LE ext     X15         Ther Activity                                       Gait Training                                       Modalities

## 2025-01-20 ENCOUNTER — OFFICE VISIT (OUTPATIENT)
Dept: PHYSICAL THERAPY | Facility: CLINIC | Age: 40
End: 2025-01-20
Payer: COMMERCIAL

## 2025-01-20 DIAGNOSIS — R26.89 GAIT, ANTALGIC: Primary | ICD-10-CM

## 2025-01-20 PROCEDURE — 97140 MANUAL THERAPY 1/> REGIONS: CPT

## 2025-01-20 PROCEDURE — 97110 THERAPEUTIC EXERCISES: CPT

## 2025-01-21 NOTE — PROGRESS NOTES
"Daily Note     Today's date: 2025  Patient name: Myla Philip  : 1985  MRN: [id number]  Referring provider: Gabriela Spivey DPM  Dx:   Encounter Diagnosis     ICD-10-CM    1. Gait, antalgic  R26.89           Start Time: 1415  Stop Time: 1500  Total time in clinic (min): 45 minutes    Subjective: She reports of 2/10 uncomfortableness in L ankle today.       Objective: See treatment diary below      Assessment: Tolerated treatment well. Slump test was performed and found to have an increased reported stretching sensation in her R foot. L/s manuals were effective for decreasing her pain in her back. At end of session focusing on hip and core strengthening with managing symptoms in her low back, she had decreased symptoms in her R foot. Patient would benefit from continued PT.      Plan: Continue per plan of care.      Precautions: N/A  POC expires Unit limit Auth Expiration date PT/OT/ST + Visit Limit?   25 4 N/A 60 VISITS                           Visit/Unit Tracking  AUTH Status:  Date               N/A Used 1               Remaining                  [email or web address]  Access Code: [email or web address]    Manuals 1/6 1/8 1/14 1/16 1/20 1/22  12/18 12/26 1/3   STM   5 mins          P-A glide     4 mins        Desentization              Mulders   4 mins          Hip post and inf and traction    15 mins         Arterial testing    DP         L/s P-A and gapping      10 mins       Big toe P-A     4 mins         Neuro Re-Ed             GTM              Pt education    DP    DP DP DP   Grid             SLS        NV 3x30\" on foam    SLS rebounder  RMB x10 b/l            Star pattern  X5; x5 w/ foam                        Ther Ex             Tendon gliding     2x90 seconds        Alphabet              Ankle DF isometric              Ankle PF isometric             Ankle Inv isometric             LAQ             Ankle 4-way              Great toe isometric        X20 10sh X20 10sh    Bridges       " 2x10       Standing calf stretch   3x1'          Toe crunches        5x10  5x10     Towel scrunches         3x1' 3x1'     Doming   X10 10sh     X10 10sh     Navicular drop test   DP          Heel raises  2x10 with big toe ext    2x15 with big toe ext     2x8 with big toe extension   Bike  6 mins 5 mins 5 mins   4 mins    NV   Re-eval           DP   Side stepping [place] 16# x8 b/l          NV   Leg Press 95# 3x10 135# x10; 155# 2x10            STS 10lb weight x10             Counter support lunges  X10 b/l           SLR abd     2x10  2x10        Seated NG      X10        Dead bugs      2x10        PB rollout      X10 10sh       Prone LE ext     X15         Ther Activity                                       Gait Training                                       Modalities

## 2025-01-22 ENCOUNTER — OFFICE VISIT (OUTPATIENT)
Dept: PHYSICAL THERAPY | Facility: CLINIC | Age: 40
End: 2025-01-22
Payer: COMMERCIAL

## 2025-01-22 DIAGNOSIS — R26.89 GAIT, ANTALGIC: Primary | ICD-10-CM

## 2025-01-22 PROCEDURE — 97140 MANUAL THERAPY 1/> REGIONS: CPT

## 2025-01-22 PROCEDURE — 97110 THERAPEUTIC EXERCISES: CPT

## 2025-01-27 ENCOUNTER — APPOINTMENT (OUTPATIENT)
Dept: PHYSICAL THERAPY | Facility: CLINIC | Age: 40
End: 2025-01-27
Payer: COMMERCIAL

## 2025-01-27 DIAGNOSIS — R26.89 GAIT, ANTALGIC: Primary | ICD-10-CM

## 2025-01-27 NOTE — PROGRESS NOTES
"Daily Note     Today's date: 2025  Patient name: Myla Philip  : 1985  MRN: [id number]  Referring provider: Gabriela Spivey DPM  Dx:   Encounter Diagnosis     ICD-10-CM    1. Gait, antalgic  R26.89                      Subjective: ***      Objective: See treatment diary below      Assessment: Tolerated treatment {Tolerated treatment :[id number]}. Patient {assessment:3294225866}      Plan: {PLAN:[id number]}     Precautions: N/A  POC expires Unit limit Auth Expiration date PT/OT/ST + Visit Limit?   25 4 N/A 60 VISITS                           Visit/Unit Tracking  AUTH Status:  Date               N/A Used 1               Remaining                  [email or web address]  Access Code: [email or web address]    Manuals 1/6 1/8 1/14 1/16 1/20 1/22 1/27 12/18 12/26 1/3   STM   5 mins          P-A glide     4 mins        Desentization              Mulders   4 mins          Hip post and inf and traction    15 mins         Arterial testing    DP         L/s P-A and gapping      10 mins       Big toe P-A     4 mins         Neuro Re-Ed             GTM              Pt education    DP    DP DP DP   Grid             SLS        NV 3x30\" on foam    SLS rebounder  RMB x10 b/l            Star pattern  X5; x5 w/ foam                        Ther Ex             Tendon gliding     2x90 seconds        Alphabet              Ankle DF isometric              Ankle PF isometric             Ankle Inv isometric             LAQ             Ankle 4-way              Great toe isometric        X20 10sh X20 10sh    Bridges       2x10       Standing calf stretch   3x1'          Toe crunches        5x10  5x10     Towel scrunches         3x1' 3x1'     Doming   X10 10sh     X10 10sh     Navicular drop test   DP          Heel raises  2x10 with big toe ext    2x15 with big toe ext     2x8 with big toe extension   Bike  6 mins 5 mins 5 mins   4 mins    NV   Re-eval           DP   Side stepping [place] 16# x8 b/l          NV   Leg Press 95# " 3x10 135# x10; 155# 2x10            STS 10lb weight x10             Counter support lunges  X10 b/l           SLR abd     2x10  2x10        Seated NG      X10        Dead bugs      2x10        PB rollout      X10 10sh       Prone LE ext     X15         Ther Activity                                       Gait Training                                       Modalities

## 2025-01-29 ENCOUNTER — OFFICE VISIT (OUTPATIENT)
Dept: PHYSICAL THERAPY | Facility: CLINIC | Age: 40
End: 2025-01-29
Payer: COMMERCIAL

## 2025-01-29 DIAGNOSIS — R26.89 GAIT, ANTALGIC: Primary | ICD-10-CM

## 2025-01-29 PROCEDURE — 97140 MANUAL THERAPY 1/> REGIONS: CPT

## 2025-01-29 PROCEDURE — 97110 THERAPEUTIC EXERCISES: CPT

## 2025-01-29 PROCEDURE — 97112 NEUROMUSCULAR REEDUCATION: CPT

## 2025-01-29 NOTE — PROGRESS NOTES
"Daily Note     Today's date: 2025  Patient name: Myla Philip  : 1985  MRN: [id number]  Referring provider: Gabriela Spivey DPM  Dx:   Encounter Diagnosis     ICD-10-CM    1. Gait, antalgic  R26.89           Start Time: 1148  Stop Time: 1230  Total time in clinic (min): 42 minutes    Subjective: Pt reports of overall tingling when driving and increased sensation of sandpaper on the bottom of her foot near the balls of her feet.       Objective: See treatment diary below      Assessment: Tolerated treatment well. She was educated to use frozen water bottle to alleviate symptoms in the R foot. AROM in DF as assessed and found to be -2 degrees. Calf stretches were given and she responded well, increasing her DF to 6 degrees. Patient would benefit from continued PT.      Plan: Continue per plan of care.      Precautions: N/A  POC expires Unit limit Auth Expiration date PT/OT/ST + Visit Limit?   25 4 N/A 60 VISITS                           Visit/Unit Tracking  AUTH Status:  Date               N/A Used 1               Remaining                  [email or web address]  Access Code: [email or web address]    Manuals 1/6 1/8 1/14 1/16 1/20 1/22 1/29 12/18 12/26 1/3   STM   5 mins          P-A glide     4 mins        Desentization              Mulders   4 mins    5 mins      Hip post and inf and traction    15 mins         Arterial testing    DP         L/s P-A and gapping      10 mins       Big toe P-A     4 mins   4 mins      Neuro Re-Ed             GTM              Pt education    DP   DP DP DP DP   Grid             SLS        NV 3x30\" on foam    SLS rebounder  RMB x10 b/l            Star pattern  X5; x5 w/ foam                        Ther Ex             Tendon gliding     2x90 seconds        Alphabet              Ankle DF isometric              Ankle PF isometric             Ankle Inv isometric             LAQ             Ankle 4-way              Great toe isometric        X20 10sh X20 10sh    Bridges  " "     2x10       DF self mob       3x30\"      Standing calf stretch   3x1'    3x30\"      Toe crunches        5x10  5x10     Towel scrunches         3x1' 3x1'     Doming   X10 10sh     X10 10sh     Navicular drop test   DP          Heel raises  2x10 with big toe ext    2x15 with big toe ext     2x8 with big toe extension   Bike  6 mins 5 mins 5 mins   4 mins    NV   Re-eval           DP   Side stepping [place] 16# x8 b/l          NV   Leg Press 95# 3x10 135# x10; 155# 2x10            STS 10lb weight x10             Counter support lunges  X10 b/l           SLR abd     2x10  2x10        Seated NG      X10        Dead bugs      2x10        PB rollout      X10 10sh       Prone LE ext     X15         Ther Activity                                       Gait Training                                       Modalities                                                          "

## 2025-02-07 ENCOUNTER — HOSPITAL ENCOUNTER (EMERGENCY)
Facility: HOSPITAL | Age: 40
Discharge: HOME | End: 2025-02-07
Attending: EMERGENCY MEDICINE
Payer: COMMERCIAL

## 2025-02-07 VITALS
TEMPERATURE: 98 F | HEART RATE: 82 BPM | WEIGHT: 177.47 LBS | HEIGHT: 62 IN | SYSTOLIC BLOOD PRESSURE: 148 MMHG | OXYGEN SATURATION: 99 % | RESPIRATION RATE: 18 BRPM | BODY MASS INDEX: 32.66 KG/M2 | DIASTOLIC BLOOD PRESSURE: 72 MMHG

## 2025-02-07 DIAGNOSIS — Z20.3 NEED FOR POST EXPOSURE PROPHYLAXIS FOR RABIES: ICD-10-CM

## 2025-02-07 DIAGNOSIS — Z20.9 EXPOSURE TO BAT WITHOUT KNOWN BITE: Primary | ICD-10-CM

## 2025-02-07 PROCEDURE — 90675 RABIES VACCINE IM: CPT | Performed by: EMERGENCY MEDICINE

## 2025-02-07 PROCEDURE — 90471 IMMUNIZATION ADMIN: CPT

## 2025-02-07 PROCEDURE — 99283 EMERGENCY DEPT VISIT LOW MDM: CPT

## 2025-02-07 PROCEDURE — 99282 EMERGENCY DEPT VISIT SF MDM: CPT | Performed by: EMERGENCY MEDICINE

## 2025-02-07 PROCEDURE — 90375 RABIES IG IM/SC: CPT | Performed by: EMERGENCY MEDICINE

## 2025-02-07 PROCEDURE — 96372 THER/PROPH/DIAG INJ SC/IM: CPT

## 2025-02-07 RX ADMIN — RABIES IMMUNE GLOBULIN (HUMAN) 1500 UNITS: 300 INJECTION, SOLUTION INFILTRATION; INTRAMUSCULAR at 17:35

## 2025-02-07 RX ADMIN — Medication 1 ML: at 17:34

## 2025-02-07 NOTE — ED PROVIDER NOTES
Time reflects when diagnosis was documented in both MDM as applicable and the Disposition within this note       Time User Action Codes Description Comment    2/7/2025  4:50 PM Jessy Galeano Add [Z20.9] Exposure to bat without known bite     2/7/2025  4:50 PM Jessy Galeano Add [Z20.3] Need for post exposure prophylaxis for rabies           ED Disposition       ED Disposition   Discharge    Condition   Stable    Date/Time   Fri Feb 7, 2025  4:50 PM    Comment   Myla Philip discharge to home/self care.                   Assessment & Plan       Medical Decision Making  39-year-old healthy female presents to the ED for possible rabies exposure.  Patient found a bat in her home 4 days ago on Tuesday.  Will start rabies vaccination series and provide rabies immunoglobulin.  Explained to patient that bug bites are extremely small and usually not felt so whenever there is a bat in the house we recommend starting the rabies vaccination series.  Patient agreeable.    Risk  Prescription drug management.        ED Course as of 02/07/25 1752   Fri Feb 07, 2025   1747 Patient stable for discharge as she has received the rabies immunoglobulin and vaccine.  Advised her to return on day 3, day 7, day 14 for repeat rabies vaccination series.       Medications   rabies immune globulin, human (HyperRAB) injection 1,500 Units (1,500 Units Infiltration Given 2/7/25 1735)   rabies vaccine, human diploid IM injection 1 mL (1 mL Intramuscular Given 2/7/25 1734)       ED Risk Strat Scores                          SBIRT 22yo+      Flowsheet Row Most Recent Value   Initial Alcohol Screen: US AUDIT-C     1. How often do you have a drink containing alcohol? 1 Filed at: 02/07/2025 1617   2. How many drinks containing alcohol do you have on a typical day you are drinking?  0 Filed at: 02/07/2025 1617   3b. FEMALE Any Age, or MALE 65+: How often do you have 4 or more drinks on one occassion? 1 Filed at: 02/07/2025 1617   Audit-C Score 2  "Filed at: 02/07/2025 8443   DEREK: How many times in the past year have you...    Used an illegal drug or used a prescription medication for non-medical reasons? Never Filed at: 02/07/2025 1506                            History of Present Illness       Chief Complaint   Patient presents with    Bat Bite or Exposure     Patient reports found bat in her house on Tuesday. Was instructed to go to ER. Denies symptoms \"I feel fine.\"        Past Medical History:   Diagnosis Date    Endometriosis       History reviewed. No pertinent surgical history.   History reviewed. No pertinent family history.   Social History     Tobacco Use    Smoking status: Never    Smokeless tobacco: Never   Vaping Use    Vaping status: Never Used   Substance Use Topics    Alcohol use: Never    Drug use: Never      E-Cigarette/Vaping    E-Cigarette Use Never User       E-Cigarette/Vaping Substances      I have reviewed and agree with the history as documented.     Patient is a 39-year-old female past medical history of narcolepsy, endometriosis, presents to the emergency department for being exposed to a bat and for the rabies vaccination.  Patient states that they found a bat upstairs in their house on Tuesday.  It was recommended by the Department of Health that they come to the ED for rabies vaccination and immunoglobulin.  Patient is unaware of any bat bite.  She is currently asymptomatic and review of systems is essentially negative.      History provided by:  Patient   used: No    Bat Bite or Exposure  Associated symptoms: no fever, no numbness and no rash        Review of Systems   Constitutional:  Negative for chills and fever.   HENT:  Negative for congestion, ear pain, rhinorrhea and sore throat.    Respiratory:  Negative for cough and shortness of breath.    Cardiovascular:  Negative for chest pain and palpitations.   Gastrointestinal:  Negative for abdominal pain, constipation, diarrhea, nausea and vomiting. "   Genitourinary:  Negative for dysuria, flank pain, frequency and hematuria.   Musculoskeletal:  Negative for back pain and neck pain.   Skin:  Negative for color change, pallor, rash and wound.   Allergic/Immunologic: Negative for immunocompromised state.   Neurological:  Negative for dizziness, weakness, light-headedness, numbness and headaches.   Hematological:  Negative for adenopathy.   Psychiatric/Behavioral:  Negative for confusion and decreased concentration.    All other systems reviewed and are negative.          Objective       ED Triage Vitals [02/07/25 1615]   Temperature Pulse Blood Pressure Respirations SpO2 Patient Position - Orthostatic VS   98 °F (36.7 °C) 82 148/72 18 99 % Sitting      Temp src Heart Rate Source BP Location FiO2 (%) Pain Score    -- Monitor Right arm -- --      Vitals      Date and Time Temp Pulse SpO2 Resp BP Pain Score FACES Pain Rating User   02/07/25 1615 98 °F (36.7 °C) 82 99 % 18 148/72 -- -- JT            Physical Exam  Vitals and nursing note reviewed.   Constitutional:       General: She is not in acute distress.     Appearance: Normal appearance. She is well-developed. She is not ill-appearing, toxic-appearing or diaphoretic.   HENT:      Head: Normocephalic and atraumatic.      Right Ear: External ear normal.      Left Ear: External ear normal.      Mouth/Throat:      Mouth: Mucous membranes are moist.      Pharynx: Oropharynx is clear.   Eyes:      Extraocular Movements: Extraocular movements intact.      Conjunctiva/sclera: Conjunctivae normal.   Neck:      Vascular: No JVD.   Cardiovascular:      Rate and Rhythm: Normal rate and regular rhythm.      Pulses: Normal pulses.      Heart sounds: Normal heart sounds. No murmur heard.     No friction rub. No gallop.   Pulmonary:      Effort: Pulmonary effort is normal. No respiratory distress.      Breath sounds: Normal breath sounds. No wheezing, rhonchi or rales.   Abdominal:      General: There is no distension.       Palpations: Abdomen is soft.      Tenderness: There is no abdominal tenderness. There is no guarding or rebound.   Musculoskeletal:         General: No deformity or signs of injury. Normal range of motion.      Cervical back: Normal range of motion and neck supple. No rigidity.   Skin:     General: Skin is warm and dry.      Coloration: Skin is not pale.      Findings: No erythema or rash.   Neurological:      General: No focal deficit present.      Mental Status: She is alert and oriented to person, place, and time.      Sensory: No sensory deficit.      Motor: No weakness.   Psychiatric:         Mood and Affect: Mood normal.         Behavior: Behavior normal.         Results Reviewed       None            No orders to display       Procedures    ED Medication and Procedure Management   Prior to Admission Medications   Prescriptions Last Dose Informant Patient Reported? Taking?   caffeine 200 mg tablet   No No   Sig: Take 1 tablet (200 mg total) by mouth 2 (two) times a day   caffeine 200 mg tablet   No No   Sig: Take 1 tablet (200 mg total) by mouth 2 (two) times a day   ketorolac (TORADOL) 10 mg tablet   No No   Sig: Take 1 tablet (10 mg total) by mouth every 6 (six) hours as needed for moderate pain   ketorolac (TORADOL) 10 mg tablet   No No   Sig: Take 1 tablet (10 mg total) by mouth every 6 (six) hours as needed for moderate pain   metoclopramide (Reglan) 10 mg tablet   No No   Sig: Take 1 tablet (10 mg total) by mouth every 6 (six) hours   metoclopramide (Reglan) 10 mg tablet   No No   Sig: Take 1 tablet (10 mg total) by mouth every 6 (six) hours      Facility-Administered Medications: None     Patient's Medications   Discharge Prescriptions    No medications on file     No discharge procedures on file.  ED SEPSIS DOCUMENTATION   Time reflects when diagnosis was documented in both MDM as applicable and the Disposition within this note       Time User Action Codes Description Comment    2/7/2025  4:50 PM  Jessy Galeano Add [Z20.9] Exposure to bat without known bite     2/7/2025  4:50 PM Jessy Galeano Add [Z20.3] Need for post exposure prophylaxis for rabies                  Jessy Galeano,   02/07/25 2632

## 2025-02-10 ENCOUNTER — HOSPITAL ENCOUNTER (EMERGENCY)
Facility: HOSPITAL | Age: 40
Discharge: HOME | End: 2025-02-10
Attending: EMERGENCY MEDICINE
Payer: COMMERCIAL

## 2025-02-10 VITALS
SYSTOLIC BLOOD PRESSURE: 122 MMHG | BODY MASS INDEX: 32.57 KG/M2 | HEIGHT: 62 IN | TEMPERATURE: 98 F | HEART RATE: 71 BPM | OXYGEN SATURATION: 98 % | DIASTOLIC BLOOD PRESSURE: 68 MMHG | WEIGHT: 177 LBS | RESPIRATION RATE: 18 BRPM

## 2025-02-10 DIAGNOSIS — Z23 ENCOUNTER FOR REPEAT ADMINISTRATION OF RABIES VACCINATION: Primary | ICD-10-CM

## 2025-02-10 PROCEDURE — 99282 EMERGENCY DEPT VISIT SF MDM: CPT | Performed by: EMERGENCY MEDICINE

## 2025-02-10 PROCEDURE — 90471 IMMUNIZATION ADMIN: CPT

## 2025-02-10 PROCEDURE — 90675 RABIES VACCINE IM: CPT | Performed by: EMERGENCY MEDICINE

## 2025-02-10 RX ADMIN — Medication 1 ML: at 19:45

## 2025-02-11 NOTE — ED PROVIDER NOTES
Time reflects when diagnosis was documented in both MDM as applicable and the Disposition within this note       Time User Action Codes Description Comment    2/10/2025  7:25 PM Bart Olguin Add [Z23] Encounter for repeat administration of rabies vaccination           ED Disposition       ED Disposition   Discharge    Condition   Stable    Date/Time   Mon Feb 10, 2025  7:25 PM    Comment   Myla Philip discharge to home/self care.                   Assessment & Plan       Medical Decision Making  39-year-old female no significant reported past history presenting for repeat rabies vaccine.  Plan for repeat rabies vaccine.  Patient aware of days for remainder of rabies vaccines. Discussed results and recommendations. Advised follow up PCP. Medication recommendations. Given instructions and return precautions. Patient/family at bedside acknowledged understanding of all written and verbal instructions and return precautions. Discharged.     Risk  Prescription drug management.             Medications   rabies vaccine, human diploid IM injection 1 mL (has no administration in time range)       ED Risk Strat Scores                                              History of Present Illness       Chief Complaint   Patient presents with    Follow Up Rabies     Pt arrives for 2nd rabies dose       Past Medical History:   Diagnosis Date    Endometriosis       History reviewed. No pertinent surgical history.   History reviewed. No pertinent family history.   Social History     Tobacco Use    Smoking status: Never    Smokeless tobacco: Never   Vaping Use    Vaping status: Never Used   Substance Use Topics    Alcohol use: Never    Drug use: Never      E-Cigarette/Vaping    E-Cigarette Use Never User       E-Cigarette/Vaping Substances      I have reviewed and agree with the history as documented.     39-year-old female no significant reported past history presenting for repeat rabies vaccine.  Patient stated family woke up few days  ago to find a bat in the house.  Unsure of bites.  Denies any complaints.  Here today for second dose of rabies vaccine.  Chart reviewed.    Past Medical History:  No date: Endometriosis  Family History: non-contributory  Social History    39-year-old female no significant reported past history presenting for repeat rabies vaccine.  Plan for repeat rabies vaccine.  Patient aware of days for remainder of rabies vaccines. Discussed results and recommendations. Advised follow up PCP. Medication recommendations. Given instructions and return precautions. Patient/family at bedside acknowledged understanding of all written and verbal instructions and return precautions. Discharged.           Review of Systems   Constitutional:  Negative for appetite change, chills, diaphoresis, fever and unexpected weight change.   HENT:  Negative for congestion and rhinorrhea.    Eyes:  Negative for photophobia and visual disturbance.   Respiratory:  Negative for cough, chest tightness and shortness of breath.    Cardiovascular:  Negative for chest pain, palpitations and leg swelling.   Gastrointestinal:  Negative for abdominal distention, abdominal pain, blood in stool, constipation, diarrhea, nausea and vomiting.   Genitourinary:  Negative for dysuria and hematuria.   Musculoskeletal:  Negative for back pain, joint swelling, neck pain and neck stiffness.   Skin:  Negative for color change, pallor, rash and wound.   Neurological:  Negative for dizziness, syncope, weakness, light-headedness and headaches.   Psychiatric/Behavioral:  Negative for agitation.    All other systems reviewed and are negative.          Objective       ED Triage Vitals [02/10/25 1901]   Temperature Pulse Blood Pressure Respirations SpO2 Patient Position - Orthostatic VS   98 °F (36.7 °C) 71 122/68 18 98 % Sitting      Temp src Heart Rate Source BP Location FiO2 (%) Pain Score    -- Monitor Left arm -- --      Vitals      Date and Time Temp Pulse SpO2 Resp BP Pain  Score FACES Pain Rating User   02/10/25 1901 98 °F (36.7 °C) 71 98 % 18 122/68 -- -- RO            Physical Exam  Constitutional:       General: She is not in acute distress.     Appearance: Normal appearance. She is not ill-appearing, toxic-appearing or diaphoretic.   HENT:      Head: Normocephalic and atraumatic.      Nose: Nose normal.      Mouth/Throat:      Mouth: Mucous membranes are moist.      Pharynx: Oropharynx is clear.   Eyes:      Conjunctiva/sclera: Conjunctivae normal.   Pulmonary:      Effort: Pulmonary effort is normal.   Abdominal:      General: Abdomen is flat.   Musculoskeletal:         General: Normal range of motion.      Cervical back: Normal range of motion.   Skin:     General: Skin is dry.   Neurological:      General: No focal deficit present.      Mental Status: She is alert.      Gait: Gait normal.   Psychiatric:         Mood and Affect: Mood normal.         Behavior: Behavior normal.         Results Reviewed       None            No orders to display       Procedures    ED Medication and Procedure Management   Prior to Admission Medications   Prescriptions Last Dose Informant Patient Reported? Taking?   caffeine 200 mg tablet   No No   Sig: Take 1 tablet (200 mg total) by mouth 2 (two) times a day   caffeine 200 mg tablet   No No   Sig: Take 1 tablet (200 mg total) by mouth 2 (two) times a day   ketorolac (TORADOL) 10 mg tablet   No No   Sig: Take 1 tablet (10 mg total) by mouth every 6 (six) hours as needed for moderate pain   ketorolac (TORADOL) 10 mg tablet   No No   Sig: Take 1 tablet (10 mg total) by mouth every 6 (six) hours as needed for moderate pain   metoclopramide (Reglan) 10 mg tablet   No No   Sig: Take 1 tablet (10 mg total) by mouth every 6 (six) hours   metoclopramide (Reglan) 10 mg tablet   No No   Sig: Take 1 tablet (10 mg total) by mouth every 6 (six) hours      Facility-Administered Medications: None     Patient's Medications   Discharge Prescriptions    No  medications on file     No discharge procedures on file.  ED SEPSIS DOCUMENTATION   Time reflects when diagnosis was documented in both MDM as applicable and the Disposition within this note       Time User Action Codes Description Comment    2/10/2025  7:25 PM Bart Olguin Add [Z23] Encounter for repeat administration of rabies vaccination                  Bart Olguin MD  02/10/25 3732

## 2025-02-14 ENCOUNTER — HOSPITAL ENCOUNTER (EMERGENCY)
Facility: HOSPITAL | Age: 40
Discharge: HOME | End: 2025-02-14
Attending: EMERGENCY MEDICINE
Payer: COMMERCIAL

## 2025-02-14 VITALS
HEART RATE: 75 BPM | SYSTOLIC BLOOD PRESSURE: 124 MMHG | OXYGEN SATURATION: 98 % | DIASTOLIC BLOOD PRESSURE: 63 MMHG | TEMPERATURE: 97.5 F | RESPIRATION RATE: 16 BRPM

## 2025-02-14 DIAGNOSIS — Z23 ENCOUNTER FOR REPEAT ADMINISTRATION OF RABIES VACCINATION: Primary | ICD-10-CM

## 2025-02-14 PROCEDURE — 90675 RABIES VACCINE IM: CPT | Performed by: EMERGENCY MEDICINE

## 2025-02-14 PROCEDURE — 99283 EMERGENCY DEPT VISIT LOW MDM: CPT | Performed by: EMERGENCY MEDICINE

## 2025-02-14 PROCEDURE — 90471 IMMUNIZATION ADMIN: CPT

## 2025-02-14 RX ADMIN — Medication 1 ML: at 15:46

## 2025-02-14 NOTE — ED PROVIDER NOTES
Time reflects when diagnosis was documented in both MDM as applicable and the Disposition within this note       Time User Action Codes Description Comment    2/14/2025  3:42 PM Maame Sierra Add [Z23] Encounter for repeat administration of rabies vaccination           ED Disposition       ED Disposition   Discharge    Condition   Stable    Date/Time   Fri Feb 14, 2025  3:42 PM    Comment   Myla Philip discharge to home/self care.                   Assessment & Plan       Medical Decision Making  38 y/o female here for repeat rabies vaccine- will administer and d/c. Instructed to follow up in 1 week for repeat vaccine     Risk  Prescription drug management.             Medications   rabies vaccine, human diploid IM injection 1 mL (1 mL Intramuscular Given 2/14/25 1546)       ED Risk Strat Scores                                              History of Present Illness       Chief Complaint   Patient presents with    Follow Up Rabies     Reports needing 3rd rabies series after having initial and one follow up series already. Denies any symptoms.        Past Medical History:   Diagnosis Date    Endometriosis       History reviewed. No pertinent surgical history.   History reviewed. No pertinent family history.   Social History     Tobacco Use    Smoking status: Never    Smokeless tobacco: Never   Vaping Use    Vaping status: Never Used   Substance Use Topics    Alcohol use: Never    Drug use: Never      E-Cigarette/Vaping    E-Cigarette Use Never User       E-Cigarette/Vaping Substances      I have reviewed and agree with the history as documented.     38 y/o female presents to the ED for rabies vaccine. States that they found a bat in the house and the testing was inconclusive so they were advised to come in for rabies vaccine. She states that this is the 3rd vaccine. No issues with the other 2. No symptoms.       History provided by:  Patient      Review of Systems   Constitutional:  Negative for chills and  fever.   HENT:  Negative for congestion, ear pain and sore throat.    Eyes:  Negative for pain and visual disturbance.   Respiratory:  Negative for cough, shortness of breath and wheezing.    Cardiovascular:  Negative for chest pain and leg swelling.   Gastrointestinal:  Negative for abdominal pain, diarrhea, nausea and vomiting.   Genitourinary:  Negative for dysuria, frequency, hematuria and urgency.   Musculoskeletal:  Negative for neck pain and neck stiffness.   Skin:  Negative for rash and wound.   Neurological:  Negative for weakness, numbness and headaches.   Psychiatric/Behavioral:  Negative for agitation and confusion.    All other systems reviewed and are negative.          Objective       ED Triage Vitals [02/14/25 1535]   Temperature Pulse Blood Pressure Respirations SpO2 Patient Position - Orthostatic VS   97.5 °F (36.4 °C) 75 124/63 16 98 % --      Temp Source Heart Rate Source BP Location FiO2 (%) Pain Score    Temporal Monitor -- -- No Pain      Vitals      Date and Time Temp Pulse SpO2 Resp BP Pain Score FACES Pain Rating User   02/14/25 1535 97.5 °F (36.4 °C) 75 98 % 16 124/63 No Pain -- KM            Physical Exam  Vitals and nursing note reviewed.   Constitutional:       Appearance: She is well-developed.   HENT:      Head: Normocephalic and atraumatic.   Eyes:      Pupils: Pupils are equal, round, and reactive to light.   Cardiovascular:      Rate and Rhythm: Normal rate and regular rhythm.   Pulmonary:      Effort: Pulmonary effort is normal.      Breath sounds: Normal breath sounds.   Abdominal:      General: Bowel sounds are normal.      Palpations: Abdomen is soft.   Musculoskeletal:         General: Normal range of motion.      Cervical back: Normal range of motion and neck supple.   Skin:     General: Skin is warm and dry.   Neurological:      General: No focal deficit present.      Mental Status: She is alert and oriented to person, place, and time.      Comments: No focal deficits          Results Reviewed       None            No orders to display       Procedures    ED Medication and Procedure Management   Prior to Admission Medications   Prescriptions Last Dose Informant Patient Reported? Taking?   caffeine 200 mg tablet   No No   Sig: Take 1 tablet (200 mg total) by mouth 2 (two) times a day   caffeine 200 mg tablet   No No   Sig: Take 1 tablet (200 mg total) by mouth 2 (two) times a day   ketorolac (TORADOL) 10 mg tablet   No No   Sig: Take 1 tablet (10 mg total) by mouth every 6 (six) hours as needed for moderate pain   ketorolac (TORADOL) 10 mg tablet   No No   Sig: Take 1 tablet (10 mg total) by mouth every 6 (six) hours as needed for moderate pain   metoclopramide (Reglan) 10 mg tablet   No No   Sig: Take 1 tablet (10 mg total) by mouth every 6 (six) hours   metoclopramide (Reglan) 10 mg tablet   No No   Sig: Take 1 tablet (10 mg total) by mouth every 6 (six) hours      Facility-Administered Medications: None     Discharge Medication List as of 2/14/2025  3:43 PM        CONTINUE these medications which have NOT CHANGED    Details   !! caffeine 200 mg tablet Take 1 tablet (200 mg total) by mouth 2 (two) times a day, Starting Mon 12/25/2023, Normal      !! caffeine 200 mg tablet Take 1 tablet (200 mg total) by mouth 2 (two) times a day, Starting Mon 12/25/2023, Print      !! ketorolac (TORADOL) 10 mg tablet Take 1 tablet (10 mg total) by mouth every 6 (six) hours as needed for moderate pain, Starting Mon 12/25/2023, Normal      !! ketorolac (TORADOL) 10 mg tablet Take 1 tablet (10 mg total) by mouth every 6 (six) hours as needed for moderate pain, Starting Mon 12/25/2023, Print      !! metoclopramide (Reglan) 10 mg tablet Take 1 tablet (10 mg total) by mouth every 6 (six) hours, Starting Mon 12/25/2023, Normal      !! metoclopramide (Reglan) 10 mg tablet Take 1 tablet (10 mg total) by mouth every 6 (six) hours, Starting Mon 12/25/2023, Print       !! - Potential duplicate medications  found. Please discuss with provider.        No discharge procedures on file.  ED SEPSIS DOCUMENTATION   Time reflects when diagnosis was documented in both MDM as applicable and the Disposition within this note       Time User Action Codes Description Comment    2/14/2025  3:42 PM Maame Sierra Add [Z23] Encounter for repeat administration of rabies vaccination                  Maame Sierra, DO  02/14/25 0229

## 2025-02-21 ENCOUNTER — HOSPITAL ENCOUNTER (EMERGENCY)
Facility: HOSPITAL | Age: 40
Discharge: HOME | End: 2025-02-21
Payer: COMMERCIAL

## 2025-02-21 VITALS
RESPIRATION RATE: 18 BRPM | BODY MASS INDEX: 32.58 KG/M2 | SYSTOLIC BLOOD PRESSURE: 123 MMHG | HEIGHT: 62 IN | DIASTOLIC BLOOD PRESSURE: 74 MMHG | HEART RATE: 65 BPM | TEMPERATURE: 98.3 F | WEIGHT: 177.03 LBS | OXYGEN SATURATION: 98 %

## 2025-02-21 DIAGNOSIS — Z23 ENCOUNTER FOR REPEAT ADMINISTRATION OF RABIES VACCINATION: Primary | ICD-10-CM

## 2025-02-21 PROCEDURE — 99283 EMERGENCY DEPT VISIT LOW MDM: CPT

## 2025-02-21 PROCEDURE — 90471 IMMUNIZATION ADMIN: CPT

## 2025-02-21 PROCEDURE — 90675 RABIES VACCINE IM: CPT

## 2025-02-21 RX ADMIN — Medication 1 ML: at 16:13

## 2025-02-23 NOTE — ED PROVIDER NOTES
Time reflects when diagnosis was documented in both MDM as applicable and the Disposition within this note       Time User Action Codes Description Comment    2/21/2025  4:05 PM Gustavo Mcallister Add [Z23] Encounter for repeat administration of rabies vaccination           ED Disposition       ED Disposition   Discharge    Condition   Stable    Date/Time   Fri Feb 21, 2025  4:14 PM    Comment   Myla Philip discharge to home/self care.                   Assessment & Plan       Medical Decision Making  Patient is a 39-year-old female PMHx endometriosis presenting for repeat rabies vaccine.  Patient stated family woke up 2/3/25 and found bat in the house.  Unsure of bites. Here today for final dose of rabies vaccine. No injection site reactions.     Vaccine ordered and administered.     I gave oral return precautions for what to return for in addition to the written return precautions.   The patient verbalized understanding of the discharge instructions and warnings that would necessitate return to the Emergency Department.  I specifically highlighted areas of special concern regarding the written and verbal discharge instructions and return precautions.    All questions were answered prior to discharge.          Risk  Prescription drug management.             Medications   rabies vaccine, human diploid IM injection 1 mL (1 mL Intramuscular Given 2/21/25 1613)       ED Risk Strat Scores                            SBIRT 20yo+      Flowsheet Row Most Recent Value   Initial Alcohol Screen: US AUDIT-C     1. How often do you have a drink containing alcohol? 0 Filed at: 02/21/2025 1603   2. How many drinks containing alcohol do you have on a typical day you are drinking?  0 Filed at: 02/21/2025 1603   3b. FEMALE Any Age, or MALE 65+: How often do you have 4 or more drinks on one occassion? 0 Filed at: 02/21/2025 1603   Audit-C Score 0 Filed at: 02/21/2025 1603   DEREK: How many times in the past year have you...    Used an  illegal drug or used a prescription medication for non-medical reasons? Never Filed at: 02/21/2025 1603                            History of Present Illness       Chief Complaint   Patient presents with    Follow Up Rabies     Last rabies shot       Past Medical History:   Diagnosis Date    Endometriosis       History reviewed. No pertinent surgical history.   History reviewed. No pertinent family history.   Social History     Tobacco Use    Smoking status: Never    Smokeless tobacco: Never   Vaping Use    Vaping status: Never Used   Substance Use Topics    Alcohol use: Never    Drug use: Never      E-Cigarette/Vaping    E-Cigarette Use Never User       E-Cigarette/Vaping Substances      I have reviewed and agree with the history as documented.     Patient is a 39-year-old female PMHx endometriosis presenting for repeat rabies vaccine.  Patient stated family woke up 2/3/25 and found bat in the house.  Unsure of bites. Here today for final dose of rabies vaccine. No injection site reactions.           Review of Systems   All other systems reviewed and are negative.          Objective       ED Triage Vitals [02/21/25 1602]   Temperature Pulse Blood Pressure Respirations SpO2 Patient Position - Orthostatic VS   98.3 °F (36.8 °C) 65 123/74 18 98 % Sitting      Temp Source Heart Rate Source BP Location FiO2 (%) Pain Score    Temporal Monitor Left arm -- --      Vitals      Date and Time Temp Pulse SpO2 Resp BP Pain Score FACES Pain Rating User   02/21/25 1602 98.3 °F (36.8 °C) 65 98 % 18 123/74 -- -- [place]            Physical Exam  Vitals and nursing note reviewed.   Constitutional:       General: She is not in acute distress.     Appearance: Normal appearance. She is well-developed and normal weight. She is not ill-appearing or toxic-appearing.   HENT:      Head: Normocephalic and atraumatic.      Right Ear: External ear normal.      Left Ear: External ear normal.      Nose: Nose normal. No congestion.      Mouth/Throat:       Mouth: Mucous membranes are moist.      Pharynx: Oropharynx is clear. No oropharyngeal exudate or posterior oropharyngeal erythema.   Eyes:      Conjunctiva/sclera: Conjunctivae normal.   Cardiovascular:      Rate and Rhythm: Normal rate and regular rhythm.      Heart sounds: Normal heart sounds. No murmur heard.  Pulmonary:      Effort: Pulmonary effort is normal. No respiratory distress.      Breath sounds: Normal breath sounds. No wheezing, rhonchi or rales.   Abdominal:      General: Abdomen is flat.   Musculoskeletal:         General: No swelling. Normal range of motion.      Cervical back: Normal range of motion and neck supple.   Skin:     General: Skin is warm and dry.      Capillary Refill: Capillary refill takes less than 2 seconds.      Findings: No erythema.   Neurological:      General: No focal deficit present.      Mental Status: She is alert and oriented to person, place, and time.   Psychiatric:         Mood and Affect: Mood normal.         Results Reviewed       None            No orders to display       Procedures    ED Medication and Procedure Management   Prior to Admission Medications   Prescriptions Last Dose Informant Patient Reported? Taking?   caffeine 200 mg tablet   No No   Sig: Take 1 tablet (200 mg total) by mouth 2 (two) times a day   caffeine 200 mg tablet   No No   Sig: Take 1 tablet (200 mg total) by mouth 2 (two) times a day   ketorolac (TORADOL) 10 mg tablet   No No   Sig: Take 1 tablet (10 mg total) by mouth every 6 (six) hours as needed for moderate pain   ketorolac (TORADOL) 10 mg tablet   No No   Sig: Take 1 tablet (10 mg total) by mouth every 6 (six) hours as needed for moderate pain   metoclopramide (Reglan) 10 mg tablet   No No   Sig: Take 1 tablet (10 mg total) by mouth every 6 (six) hours   metoclopramide (Reglan) 10 mg tablet   No No   Sig: Take 1 tablet (10 mg total) by mouth every 6 (six) hours      Facility-Administered Medications: None     Discharge Medication  List as of 2/21/2025  4:14 PM        CONTINUE these medications which have NOT CHANGED    Details   !! caffeine 200 mg tablet Take 1 tablet (200 mg total) by mouth 2 (two) times a day, Starting Mon 12/25/2023, Normal      !! caffeine 200 mg tablet Take 1 tablet (200 mg total) by mouth 2 (two) times a day, Starting Mon 12/25/2023, Print      !! ketorolac (TORADOL) 10 mg tablet Take 1 tablet (10 mg total) by mouth every 6 (six) hours as needed for moderate pain, Starting Mon 12/25/2023, Normal      !! ketorolac (TORADOL) 10 mg tablet Take 1 tablet (10 mg total) by mouth every 6 (six) hours as needed for moderate pain, Starting Mon 12/25/2023, Print      !! metoclopramide (Reglan) 10 mg tablet Take 1 tablet (10 mg total) by mouth every 6 (six) hours, Starting Mon 12/25/2023, Normal      !! metoclopramide (Reglan) 10 mg tablet Take 1 tablet (10 mg total) by mouth every 6 (six) hours, Starting Mon 12/25/2023, Print       !! - Potential duplicate medications found. Please discuss with provider.        No discharge procedures on file.  ED SEPSIS DOCUMENTATION   Time reflects when diagnosis was documented in both MDM as applicable and the Disposition within this note       Time User Action Codes Description Comment    2/21/2025  4:05 PM Gustavo Mcallister Add [Z23] Encounter for repeat administration of rabies vaccination                  Gustavo Mcallister, DO  02/23/25 0051